# Patient Record
Sex: MALE | Race: BLACK OR AFRICAN AMERICAN
[De-identification: names, ages, dates, MRNs, and addresses within clinical notes are randomized per-mention and may not be internally consistent; named-entity substitution may affect disease eponyms.]

---

## 2017-06-20 ENCOUNTER — HOSPITAL ENCOUNTER (INPATIENT)
Dept: HOSPITAL 61 - ER | Age: 22
LOS: 2 days | Discharge: HOME | DRG: 501 | End: 2017-06-22
Attending: INTERNAL MEDICINE | Admitting: INTERNAL MEDICINE
Payer: SELF-PAY

## 2017-06-20 VITALS — HEIGHT: 77 IN | BODY MASS INDEX: 21.25 KG/M2 | WEIGHT: 180 LBS

## 2017-06-20 VITALS — SYSTOLIC BLOOD PRESSURE: 126 MMHG | DIASTOLIC BLOOD PRESSURE: 73 MMHG

## 2017-06-20 VITALS — DIASTOLIC BLOOD PRESSURE: 82 MMHG | SYSTOLIC BLOOD PRESSURE: 142 MMHG

## 2017-06-20 VITALS — SYSTOLIC BLOOD PRESSURE: 120 MMHG | DIASTOLIC BLOOD PRESSURE: 66 MMHG

## 2017-06-20 DIAGNOSIS — S86.022A: Primary | ICD-10-CM
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
## 2017-06-20 DIAGNOSIS — F12.90: ICD-10-CM
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
## 2017-06-20 DIAGNOSIS — S92.002B: ICD-10-CM
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
## 2017-06-20 LAB
ANION GAP SERPL CALC-SCNC: 9 MMOL/L (ref 6–14)
BASOPHILS # BLD AUTO: 0.1 X10^3/UL (ref 0–0.2)
BASOPHILS NFR BLD: 1 % (ref 0–3)
BUN SERPL-MCNC: 18 MG/DL (ref 8–26)
CALCIUM SERPL-MCNC: 9.2 MG/DL (ref 8.5–10.1)
CHLORIDE SERPL-SCNC: 104 MMOL/L (ref 98–107)
CO2 SERPL-SCNC: 27 MMOL/L (ref 21–32)
CREAT SERPL-MCNC: 1.2 MG/DL (ref 0.7–1.3)
EOSINOPHIL NFR BLD: 1 % (ref 0–3)
ERYTHROCYTE [DISTWIDTH] IN BLOOD BY AUTOMATED COUNT: 13.4 % (ref 11.5–14.5)
GFR SERPLBLD BASED ON 1.73 SQ M-ARVRAT: 90.8 ML/MIN
GLUCOSE SERPL-MCNC: 87 MG/DL (ref 70–99)
HCT VFR BLD CALC: 42.5 % (ref 39–53)
HGB BLD-MCNC: 14.6 G/DL (ref 13–17.5)
LYMPHOCYTES # BLD: 1.6 X10^3/UL (ref 1–4.8)
LYMPHOCYTES NFR BLD AUTO: 14 % (ref 24–48)
MCH RBC QN AUTO: 31 PG (ref 25–35)
MCHC RBC AUTO-ENTMCNC: 35 G/DL (ref 31–37)
MCV RBC AUTO: 89 FL (ref 79–100)
MONOCYTES NFR BLD: 6 % (ref 0–9)
NEUTROPHILS NFR BLD AUTO: 79 % (ref 31–73)
PLATELET # BLD AUTO: 149 X10^3/UL (ref 140–400)
POTASSIUM SERPL-SCNC: 4.1 MMOL/L (ref 3.5–5.1)
RBC # BLD AUTO: 4.77 X10^6/UL (ref 4.3–5.7)
SODIUM SERPL-SCNC: 140 MMOL/L (ref 136–145)
WBC # BLD AUTO: 11 X10^3/UL (ref 4–11)

## 2017-06-20 PROCEDURE — 85610 PROTHROMBIN TIME: CPT

## 2017-06-20 PROCEDURE — 85018 HEMOGLOBIN: CPT

## 2017-06-20 PROCEDURE — 36415 COLL VENOUS BLD VENIPUNCTURE: CPT

## 2017-06-20 PROCEDURE — 73630 X-RAY EXAM OF FOOT: CPT

## 2017-06-20 PROCEDURE — 96372 THER/PROPH/DIAG INJ SC/IM: CPT

## 2017-06-20 PROCEDURE — 73610 X-RAY EXAM OF ANKLE: CPT

## 2017-06-20 PROCEDURE — 85014 HEMATOCRIT: CPT

## 2017-06-20 PROCEDURE — 85730 THROMBOPLASTIN TIME PARTIAL: CPT

## 2017-06-20 PROCEDURE — 80048 BASIC METABOLIC PNL TOTAL CA: CPT

## 2017-06-20 PROCEDURE — 85027 COMPLETE CBC AUTOMATED: CPT

## 2017-06-20 RX ADMIN — HYDROCODONE BITARTRATE AND ACETAMINOPHEN PRN TAB: 10; 325 TABLET ORAL at 18:23

## 2017-06-20 RX ADMIN — HYDROMORPHONE HYDROCHLORIDE PRN MG: 2 INJECTION INTRAMUSCULAR; INTRAVENOUS; SUBCUTANEOUS at 19:50

## 2017-06-20 NOTE — ACF
Admit Criteria Forms





               MUSCULOSKELETAL DISEASE GRG





Clinical Indications for Admission to Inpatient Care





                                                            (Place 'X' for any 

and all applicable criteria):





Hospital admission is needed for appropriate care of the patient because of 1 

or more of the following:





[X]I.    Fracture, dislocation, or other musculoskeletal injury requiring 

inpatient care(medical) as indicated 


         by 1 or more of the following(4)(5)(6)(7)


         [ ]a)  Vertebral fracture requiring observation for instability or 

neurologic compromise (8)


         [ ]b)  Compartment syndrome (proven or cannot be ruled out during 

observation level of care) (9)


         [ ]c)  Limb-threatening injury


         [ ]d)  Major injury requiring inpatient stabilization such as traction 

initiation or external fixation 


                 before internal fixation or closure of complex or open fracture


         [ ]e)  Major injury requiring inpatient treatment after emergency or 

observation level care (as appropriate)


         [X]f)   Severe pain requiring acute inpatient management


         [ ]g)  Injury with suspicion of abuse or neglect (eg., child, 

dependent elderly)





[ ]II.    Newly diagnosed or suspected bone, joint, or orthopedic device 

infection (e.g., osteomyelitis, septic arthritis) 


          needing 1 or more of the following(1)(2)(3)


          [ ]a)   IV antibiotics that cannot be initiated in other than 

inpatient setting (e.g., patient too unstable or


                   home infusion not available)


          [ ]b)   Device removal or replacement


          [ ]c)   Bone or soft tissue debridement


          [ ]d)   Joint drainage (drain placement or repetitive aspirations)


[ ]III.    Severe rheumatologic disease (e.g., systemic lupus erythematosus, 

rheumatoid arthritis) with complications


          or comorbidities (Also use Optimal Recovery Care Criteria or General 

Recovery Criteria as appropriate on the 


          basis of predominant condition), including 1 or more of the following(

10)(11)(12)(13)


          [ ]a)  Severe infection (e.g., CNS infection, sepsis) (14)


          [ ]b)  Respiratory complications, including 1 or more of the following

:


                  [ ]i)     Pleural effusion with respiratory compromise      


                  [ ]ii)    Pulmonary hypertension with congestive failure 


                  [ ]iii)   Respiratory failure


                  [ ]iv)   Pulmonary hemorrhage (15)


           [ ]c) Hematologic disease, including 1 or more of the following:


                 [ ]i)     Coagulopathy with bleeding        


                 [ ]ii)    Thrombosis with hypercoagulable state      


                 [ ]iii)   Thrombotic thrombocytopenic purpura 


           [ ]d) Cerebritis with seizures, psychosis, or other severe 

abnormalities


           [ ]e) Vertebral destruction with monitoring needed for cervical 

myelopathy& possible respiratory compromise


           [ ]f)  Exacerbation that requires inpatient treatment (e.g., 

intravenous immunosuppression) (16)


           [ ]g) Acute renal failure


           [ ]h) Cerebritis with seizures, psychosis, Altered mental status, or 

other neurologic abnormalities


           [ ]i) Pericardial effusion with tamponade


           [ ]j) Vertebral destruction, with monitoring needed for cervical 

myelopathy and possible respiratory compromise





[ ]IV. Severe vasculitis with complications or comorbidities (Also use Optimal 

Recovery Care Criteria 


        General Recovery Criteria as appropriate on the basis of predominant 

condition), including


        1 or more of the following(11)(12)(17)(18)(19)(20)


        [ ]a)   Exacerbation that requires inpatient treatment (e.g., 

intravenous immunosuppression) (19)(21)


        [ ]b)    Pulmonary hemorrhage (15)                                     

                                  


        [ ]c)   CNS vasculitis with seizures, psychosis, Altered mental status 

that is severe or persistent, or other severe abnormalities (22)


        [ ]d)  Cerebral infarction                                             


        [ ]e)  Gastrointestinal ischemia 


        [ ]f)   Gangrene or threatened amputation


        [ ]g)  Renal failure (16)   


        [ ]h) Other significant complications of vasculitis ( eg., tissue or 

organ ischemia, organ dysfunction )





[ ]V.  Severe myopathy as indicated by 1 or more of the following (28)(29)


        [ ]a)  New onset of airway compromise or inability to swallow


        [ ]b)  Respiratory deterioration with observation needed for impending 

respiratory failure


        [ ]c)  Exacerbation that requires inpatient treatment (e.g., 

intravenous immunosuppression) 





[ ]VI. Severe crystal gout (arthropathy) indicated by 1 or more of the 

following (23)(24)


        [ ]a)  Severe pain requiring acute inpatient management


        [ ]b)  Exacerbation that requires inpatient treatment (e.g., 

intravenous treatment) 





[ ]VII.Rhabdomyolysis and 1 or more of the following (25)(26)(27)


        [ ]a)  Acute renal failure


        [ ]b)  Need for intravenous hydration after emergency or observation 

level care (as appropriate)


        [ ]c)  Inability to maintain oral hydration


        [ ]d)  Change in mental status


        [ ]e)  Electrolyte abnormality that remains after emergency or 

observation level care (as appropriate) 





[ ]VIII Post amputation complication, as indicated by ANY ONE of the following 


         [ ]a) Infection


         [ ]b) Dehiscence


         [ ]c) Myodesis failure        





[ ]IX. Severe pain requiring acute inpatient management due to musculoskeletal 

condition


       


[ ]X.  Musculoskeletal Disease and ALL of the following:


        [ ]a)  Symptom or finding for which emergency and observation care have 

failed or are not considered 


                appropriate (Use General Criteria: Observation Care as 

appropriate)


        [ ]b)  Presence of ANY ONE of the following


               [ ]i)   A General Admission Criteria    


               [ ]ii)  A Pediatric General Admission Criteria 











The original [facility] content created by [facility] has been revised. 


The portions of the content which have been revised are identified through the 

use of italic text or in bold, and [facility] 


has neither reviewed nor approved the modified material. All other unmodified 

content is copyright  [facility].





Please see references footnoted in the original [facility] edition 

2016











RAMBO SIGALA Jun 20, 2017 15:17

## 2017-06-20 NOTE — RAD
Examination: 3 views of the left ankle and foot



History: History of lawnmower laceration to back of the heel



Comparison: None available 



Findings:





The ankle mortise appears intact. The alignment of the tarsal bones,

tarsometatarsal joints grossly appears unremarkable. Mild hallux valgus

identified



There is laceration identified in the posterior left foot at the level of the

Achilles tendon attachment to the calcaneus. The laceration appears to extend

anteriorly to posterior. Subtle bony fragments identified superior to the

calcaneus could be tiny avulsed fracture fragments.



Impression:



1. Large laceration identified superior to the calcaneus at the attachment of

the Achilles tendon to the calcaneus, suspect Achilles tendon injury. Subtle

bony fragments identified superior to the calcaneus could be tiny avulsed

fracture fragments.

## 2017-06-20 NOTE — PHYS DOC
Past Medical History


Past Medical History:  No Pertinent History


Past Surgical History:  No Surgical History


Alcohol Use:  None


Drug Use:  None





Adult General


Chief Complaint


Chief Complaint:  FOOT INJURY PAIN





[facility]


HPI





Patient is a 22  year old male who presents with with family for evaluation of 

left posterior foot pain and laceration, achy pain that is constant, worse with 

range of motion. He was mowing, when he tried to run away from wasps. His mower 

fell over on its side and the back of his left foot was hit by a a mower blade. 

He is unable to bear weight on his left foot. He denies numbness, tingling. He 

denies other injury.





Review of Systems


Review of Systems





Constitutional: Denies fever or chills []


Eyes: Denies change in visual acuity, redness, or eye pain []


HENT: Denies nasal congestion or sore throat []


Respiratory: Denies cough or shortness of breath []


Cardiovascular: No additional information not addressed in HPI []


GI: Denies abdominal pain, nausea, vomiting, bloody stools or diarrhea []


: Denies dysuria or hematuria []


Musculoskeletal: Denies back pain []


Integument: Denies rash or skin lesions []


Neurologic: Denies headache, focal weakness or sensory changes []


Endocrine: Denies polyuria or polydipsia []





Current Medications


Current Medications





Current Medications








 Medications


  (Trade)  Dose


 Ordered  Sig/Rachael  Start Time


 Stop Time Status Last Admin


Dose Admin


 


 Acetaminophen/


 Hydrocodone Bitart


  (Lortab 10/325)  1 tab  1X  ONCE  6/20/17 14:15


 6/20/17 14:16 DC 6/20/17 14:10


1 TAB











Allergies


Allergies





Allergies








Coded Allergies Type Severity Reaction Last Updated Verified


 


  No Known Drug Allergies    6/20/17 No











Physical Exam


Physical Exam





Constitutional: Well developed, well nourished, no acute distress, non-toxic 

appearance. []


HENT: Normocephalic, atraumatic, bilateral external ears normal, oropharynx 

moist, nose normal. []


Eyes: PERRLA, EOMI. [] 


Neck: Normal range of motion, no tenderness, supple. [] 


Cardiovascular:Heart rate regular rhythm []


Lungs & Thorax:  Bilateral breath sounds clear to auscultation []


Abdomen: Bowel sounds normal, soft, no tenderness. [] 


Skin: Warm, dry, no erythema, no rash. [] 


Back: Normal ROM. [] 


Extremities: Left lower extremity with laceration to posterior aspect of left 

ankle just proximal to calcaneus with obvious tendon laceration, slight oozing 

blood; no visualized bone; Able to flex/ex/IR/ER hip, knee full rom, ankle df 

intact, but cannot plantar flex, toes df/pf; SILT watson/sa/sp/dp/tib distributions

; good dp and pt pulses equal bilaterally


Neurologic: Alert and oriented X 3, normal motor function, normal sensory 

function, no focal deficits noted. []


Psychologic: Affect normal, judgement normal, mood normal. []





Current Patient Data


Vital Signs





 Vital Signs








  Date Time  Temp Pulse Resp B/P (MAP) Pulse Ox O2 Delivery O2 Flow Rate FiO2


 


6/20/17 14:33  64 16 130/69 (89) 98 Room Air  


 


6/20/17 13:30 97.7       





 97.7       











Radiology/Procedures


Radiology/Procedures


X-ray of left foot and left ankle as interpreted by me with no obvious fracture 

or dislocation, has obvious soft tissue defect with subcutaneous air just above 

the calcaneus





Course & Med Decision Making


Course & Med Decision Making


Pertinent Labs and Imaging studies reviewed. (See chart for details)





Wound was irrigated after administration of lidocaine, 8ml SQ.  Hemostasis 

achieved.  Discussed case with Dr. Toure, orthopedics, who recommends admission 

for operative management in the morning. Discussed case with Dr. Lee, general 

surgery, for trauma consult. Consult orders placed. Discussed case with Dr. Rios, who will admit.





Dragon Disclaimer


Dragon Disclaimer


This electronic medical record was generated, in whole or in part, using a 

voice recognition dictation system.





Departure


Departure


Impression:  


 Primary Impression:  


 Laceration of left Achilles tendon, initial encounter


Disposition:  09 ADMITTED AS INPATIENT


Condition:  STABLE


Referrals:  


BAYLEE NICHOLS (PCP)











TWAN READ MD Jun 20, 2017 14:20

## 2017-06-20 NOTE — HP
ADMIT DATE:  06/20/2017



CHIEF COMPLAINT:  Injury in a lawnmower accident to left foot.



HISTORY OF PRESENT ILLNESS:  The patient is a 22-year-old 

gentleman without any medical history who presented to the Emergency Room with

laceration and pain to his left posterior foot.  He was found with Achilles

tendon laceration.  He now has been admitted with plans for surgery by Dr. Toure

tomorrow.  Currently, he relates that pain is fairly intense.  He denies any

numbness or tingling in the distal foot.  Denies any other injuries or areas of

pain.



PAST MEDICAL HISTORY:  None.



FAMILY HISTORY:  Negative.



SOCIAL HISTORY:  Lives with his parents.  Smokes pot occasionally.  No tobacco

use or other drugs.



ALLERGIES:  No known drug allergies.



HOME MEDICATIONS:  None.



REVIEW OF SYSTEMS:  Positive for left foot pain, fairly intense at this time,

the patient is requesting pain medication.  Denies any other complaints in rest

of organ system review.



PHYSICAL EXAMINATION:

VITAL SIGNS:  From today show a blood pressure of 142/82, heart rate of 68,

respiratory rate at 18.  He is afebrile.  Of note, previous blood pressures have

been in the 130/60 range.

GENERAL:  This is a slim 22-year-old  gentleman, alert and

oriented, in no acute distress.

HEENT:  Shows no scleral icterus.

NECK:  Supple.

LUNGS:  Clear.

HEART:  Has regular rate and rhythm.

ABDOMEN:  Has positive bowel sounds, soft, nontender.

EXTREMITIES:  Show no edema.  Gauze bandage around his left heel is soaked with

blood.



LABORATORY DATA:  CBC with a WBC of 11.0, hemoglobin 14.6, platelets of 149. 

Chemistries with a BUN and creatinine of 18 and 1.2 and normal electrolytes. 

Coags not obtained.



IMAGING:  X-ray of the left foot and ankle shows large laceration superior to

the calcaneus at the attachment of the Achilles tendon to the calcaneus,

suspected Achilles tendon injury, subtle bony fragments identified superior to

the calcaneus could be tiny avulsed fracture fragments.



ASSESSMENT AND PLAN:  The patient is a 22-year-old gentleman with heel injury

suspected Achilles tendon injury.



PLAN:  Plan is for surgery by Dr. Toure in the a.m.  In the meantime, pain

control will be achieved with Norco, this seemed to work fairly well for him in

the Emergency Room.  He has Dilaudid as backup.  He will be n.p.o. in the a.m. 

We will hold off anticoagulation at this time pending surgery.

 



______________________________

MIRIAM TOWNSEND MD



DR:  NATO/nts  JOB#:  [id number] / [id number]

DD:  06/20/2017 18:16  DT:  06/20/2017 21:32

AMANDA

## 2017-06-21 VITALS — SYSTOLIC BLOOD PRESSURE: 117 MMHG | DIASTOLIC BLOOD PRESSURE: 48 MMHG

## 2017-06-21 VITALS — SYSTOLIC BLOOD PRESSURE: 138 MMHG | DIASTOLIC BLOOD PRESSURE: 64 MMHG

## 2017-06-21 VITALS — DIASTOLIC BLOOD PRESSURE: 57 MMHG | SYSTOLIC BLOOD PRESSURE: 116 MMHG

## 2017-06-21 VITALS — DIASTOLIC BLOOD PRESSURE: 58 MMHG | SYSTOLIC BLOOD PRESSURE: 121 MMHG

## 2017-06-21 VITALS — SYSTOLIC BLOOD PRESSURE: 115 MMHG | DIASTOLIC BLOOD PRESSURE: 61 MMHG

## 2017-06-21 VITALS — DIASTOLIC BLOOD PRESSURE: 45 MMHG | SYSTOLIC BLOOD PRESSURE: 111 MMHG

## 2017-06-21 VITALS — DIASTOLIC BLOOD PRESSURE: 64 MMHG | SYSTOLIC BLOOD PRESSURE: 135 MMHG

## 2017-06-21 VITALS — DIASTOLIC BLOOD PRESSURE: 84 MMHG | SYSTOLIC BLOOD PRESSURE: 129 MMHG

## 2017-06-21 VITALS — DIASTOLIC BLOOD PRESSURE: 62 MMHG | SYSTOLIC BLOOD PRESSURE: 119 MMHG

## 2017-06-21 VITALS — DIASTOLIC BLOOD PRESSURE: 48 MMHG | SYSTOLIC BLOOD PRESSURE: 12 MMHG

## 2017-06-21 VITALS — SYSTOLIC BLOOD PRESSURE: 115 MMHG | DIASTOLIC BLOOD PRESSURE: 65 MMHG

## 2017-06-21 LAB
ANION GAP SERPL CALC-SCNC: 8 MMOL/L (ref 6–14)
APTT BLD: 34 SEC (ref 24–38)
BASOPHILS # BLD AUTO: 0.1 X10^3/UL (ref 0–0.2)
BASOPHILS NFR BLD: 1 % (ref 0–3)
BUN SERPL-MCNC: 17 MG/DL (ref 8–26)
CALCIUM SERPL-MCNC: 8.7 MG/DL (ref 8.5–10.1)
CHLORIDE SERPL-SCNC: 105 MMOL/L (ref 98–107)
CO2 SERPL-SCNC: 29 MMOL/L (ref 21–32)
CREAT SERPL-MCNC: 1.2 MG/DL (ref 0.7–1.3)
EOSINOPHIL NFR BLD: 2 % (ref 0–3)
ERYTHROCYTE [DISTWIDTH] IN BLOOD BY AUTOMATED COUNT: 13.2 % (ref 11.5–14.5)
GFR SERPLBLD BASED ON 1.73 SQ M-ARVRAT: 90.8 ML/MIN
GLUCOSE SERPL-MCNC: 86 MG/DL (ref 70–99)
HCT VFR BLD CALC: 40.9 % (ref 39–53)
HGB BLD-MCNC: 14 G/DL (ref 13–17.5)
INR PPP: 1.3 (ref 0.8–1.1)
LYMPHOCYTES # BLD: 2.1 X10^3/UL (ref 1–4.8)
LYMPHOCYTES NFR BLD AUTO: 22 % (ref 24–48)
MCH RBC QN AUTO: 31 PG (ref 25–35)
MCHC RBC AUTO-ENTMCNC: 34 G/DL (ref 31–37)
MCV RBC AUTO: 89 FL (ref 79–100)
MONOCYTES NFR BLD: 9 % (ref 0–9)
NEUTROPHILS NFR BLD AUTO: 68 % (ref 31–73)
PLATELET # BLD AUTO: 147 X10^3/UL (ref 140–400)
POTASSIUM SERPL-SCNC: 3.6 MMOL/L (ref 3.5–5.1)
PROTHROMBIN TIME: 15.2 SEC (ref 11.7–14)
RBC # BLD AUTO: 4.58 X10^6/UL (ref 4.3–5.7)
SODIUM SERPL-SCNC: 142 MMOL/L (ref 136–145)
WBC # BLD AUTO: 9.6 X10^3/UL (ref 4–11)

## 2017-06-21 PROCEDURE — 0LMP0ZZ REATTACHMENT OF LEFT LOWER LEG TENDON, OPEN APPROACH: ICD-10-PCS | Performed by: ORTHOPAEDIC SURGERY

## 2017-06-21 PROCEDURE — 0QBM0ZZ EXCISION OF LEFT TARSAL, OPEN APPROACH: ICD-10-PCS | Performed by: ORTHOPAEDIC SURGERY

## 2017-06-21 RX ADMIN — MORPHINE SULFATE PRN MG: 4 INJECTION, SOLUTION INTRAMUSCULAR; INTRAVENOUS at 18:50

## 2017-06-21 RX ADMIN — FENTANYL CITRATE PRN MCG: 50 INJECTION INTRAMUSCULAR; INTRAVENOUS at 15:30

## 2017-06-21 RX ADMIN — HYDROMORPHONE HYDROCHLORIDE PRN MG: 2 INJECTION INTRAMUSCULAR; INTRAVENOUS; SUBCUTANEOUS at 10:49

## 2017-06-21 RX ADMIN — HYDROMORPHONE HYDROCHLORIDE PRN MG: 2 INJECTION INTRAMUSCULAR; INTRAVENOUS; SUBCUTANEOUS at 08:23

## 2017-06-21 RX ADMIN — HYDROMORPHONE HYDROCHLORIDE PRN MG: 2 INJECTION INTRAMUSCULAR; INTRAVENOUS; SUBCUTANEOUS at 01:39

## 2017-06-21 RX ADMIN — MORPHINE SULFATE PRN MG: 4 INJECTION, SOLUTION INTRAMUSCULAR; INTRAVENOUS at 23:08

## 2017-06-21 RX ADMIN — FENTANYL CITRATE PRN MCG: 50 INJECTION INTRAMUSCULAR; INTRAVENOUS at 14:57

## 2017-06-21 RX ADMIN — MORPHINE SULFATE PRN MG: 4 INJECTION, SOLUTION INTRAMUSCULAR; INTRAVENOUS at 16:10

## 2017-06-21 RX ADMIN — MORPHINE SULFATE PRN MG: 4 INJECTION, SOLUTION INTRAMUSCULAR; INTRAVENOUS at 21:05

## 2017-06-21 RX ADMIN — FENTANYL CITRATE PRN MCG: 50 INJECTION INTRAMUSCULAR; INTRAVENOUS at 15:13

## 2017-06-21 RX ADMIN — FENTANYL CITRATE PRN MCG: 50 INJECTION INTRAMUSCULAR; INTRAVENOUS at 15:02

## 2017-06-21 NOTE — PDOC
PROGRESS NOTES


Chief Complaint


Chief Complaint


Achilles tendon injury.








ASASESSMENT AND PLAN: 


1.  Achilles tendon injury:  surgery by Dr. Toure later today 


2.  Pain control:  adequate;  Norco, dilaudid





History of Present Illness


History of Present Illness


pain fairly well controlled.  no other c/o





Vitals


Vitals





Vital Signs








  Date Time  Temp Pulse Resp B/P (MAP) Pulse Ox O2 Delivery O2 Flow Rate FiO2


 


6/21/17 08:23   16   Room Air  


 


6/21/17 07:00 97.8 62  138/64 (88) 99   





 97.8       











Physical Exam


General:  Alert, Oriented X3, Cooperative, No acute distress


Heart:  Regular rate


Lungs:  Clear


Abdomen:  Normal bowel sounds, Soft, No tenderness


Extremities:  No clubbing, No edema, Other (L foot)


Skin:  No rashes





Labs


LABS





Laboratory Tests








Test


  6/20/17


15:10 6/21/17


07:20


 


White Blood Count


  11.0 x10^3/uL


(4.0-11.0) 9.6 x10^3/uL


(4.0-11.0)


 


Red Blood Count


  4.77 x10^6/uL


(4.30-5.70) 4.58 x10^6/uL


(4.30-5.70)


 


Hemoglobin


  14.6 g/dL


(13.0-17.5) 14.0 g/dL


(13.0-17.5)


 


Hematocrit


  42.5 %


(39.0-53.0) 40.9 %


(39.0-53.0)


 


Mean Corpuscular Volume 89 fL ()  89 fL () 


 


Mean Corpuscular Hemoglobin 31 pg (25-35)  31 pg (25-35) 


 


Mean Corpuscular Hemoglobin


Concent 35 g/dL


(31-37) 34 g/dL


(31-37)


 


Red Cell Distribution Width


  13.4 %


(11.5-14.5) 13.2 %


(11.5-14.5)


 


Platelet Count


  149 x10^3/uL


(140-400) 147 x10^3/uL


(140-400)


 


Neutrophils (%) (Auto) 79 % (31-73)  68 % (31-73) 


 


Lymphocytes (%) (Auto) 14 % (24-48)  22 % (24-48) 


 


Monocytes (%) (Auto) 6 % (0-9)  9 % (0-9) 


 


Eosinophils (%) (Auto) 1 % (0-3)  2 % (0-3) 


 


Basophils (%) (Auto) 1 % (0-3)  1 % (0-3) 


 


Neutrophils # (Auto)


  8.6 x10^3uL


(1.8-7.7) 6.5 x10^3uL


(1.8-7.7)


 


Lymphocytes # (Auto)


  1.6 x10^3/uL


(1.0-4.8) 2.1 x10^3/uL


(1.0-4.8)


 


Monocytes # (Auto)


  0.6 x10^3/uL


(0.0-1.1) 0.8 x10^3/uL


(0.0-1.1)


 


Eosinophils # (Auto)


  0.1 x10^3/uL


(0.0-0.7) 0.2 x10^3/uL


(0.0-0.7)


 


Basophils # (Auto)


  0.1 x10^3/uL


(0.0-0.2) 0.1 x10^3/uL


(0.0-0.2)


 


Sodium Level


  140 mmol/L


(136-145) 142 mmol/L


(136-145)


 


Potassium Level


  4.1 mmol/L


(3.5-5.1) 3.6 mmol/L


(3.5-5.1)


 


Chloride Level


  104 mmol/L


() 105 mmol/L


()


 


Carbon Dioxide Level


  27 mmol/L


(21-32) 29 mmol/L


(21-32)


 


Anion Gap 9 (6-14)  8 (6-14) 


 


Blood Urea Nitrogen


  18 mg/dL


(8-26) 17 mg/dL


(8-26)


 


Creatinine


  1.2 mg/dL


(0.7-1.3) 1.2 mg/dL


(0.7-1.3)


 


Estimated GFR


(Cockcroft-Gault) 90.8 


  90.8 


 


 


Glucose Level


  87 mg/dL


(70-99) 86 mg/dL


(70-99)


 


Calcium Level


  9.2 mg/dL


(8.5-10.1) 8.7 mg/dL


(8.5-10.1)


 


Prothrombin Time


  


  15.2 SEC


(11.7-14.0)


 


Prothromb Time International


Ratio 


  1.3 (0.8-1.1) 


 


 


Activated Partial


Thromboplast Time 


  34 SEC (24-38) 


 

















MIRIAM TOWNSEND MD Jun 21, 2017 10:33

## 2017-06-21 NOTE — PDOC2
CONSULT


Date of Consult


Date of Consult


DATE: 6/21/17





Reason for Consult


Reason for Consult:


left Achilles tendon rupture





Referring Physician


Referring Physician:


Dr. Garrett





Identification/Chief Complaint


Chief Complaint


left heel pain





Source


Source:  Chart review, Patient





History of Present Illness


Reason for Visit:


Mr. Dixon is a 22 year old male patient who presented to the ED yesterday with 

a lacerated Achilles tendon. He was using a self-propelled lawnmower when he 

ran into a wasp nest. As he was trying to run away, the lawnmower turned on its 

side and the blade cut the back of his heel. He denies any tingling or numbness 

in the foot.





Past Medical History


Cardiovascular:  No pertinent hx


Pulmonary:  No pertinent hx


Heme/Onc:  No pertinent hx





Past Surgical History


Past Surgical History:  No pertinent history





Family History


Family History:  No Significant





Social History


No


ALCOHOL:  none


Drugs:  None


Lives:  with Family





Current Problem List


Problem List


Problems


Medical Problems:


(1) Laceration of left Achilles tendon, initial encounter


Status: Acute  











Current Medications


Current Medications





Current Medications


Acetaminophen/ Hydrocodone Bitart (Lortab 10/325) 1 tab 1X  ONCE PO  Last 

administered on 6/20/17at 14:10;  Start 6/20/17 at 14:15;  Stop 6/20/17 at 14:16

;  Status DC


Lidocaine/Sodium Bicarbonate (Buffered Lidocaine 1%) 20 ml 1X  ONCE IJ  Last 

administered on 6/20/17at 15:00;  Start 6/20/17 at 15:00;  Stop 6/20/17 at 15:01

;  Status DC


Lidocaine HCl 20 ml STK-MED ONCE .ROUTE ;  Start 6/20/17 at 14:48;  Stop 6/20/ 17 at 14:49;  Status DC


Ondansetron HCl (Zofran) 4 mg PRN Q6HRS  PRN IV NAUSEA/VOMITING;  Start 6/21/17 

at 07:00;  Stop 6/22/17 at 06:59


Fentanyl Citrate (Fentanyl 2ml Vial) 25 mcg PRN Q5MIN  PRN IV MILD PAIN;  Start 

6/21/17 at 07:00;  Stop 6/21/17 at 07:00;  Status DC


Fentanyl Citrate (Fentanyl 2ml Vial) 50 mcg PRN Q5MIN  PRN IV MODERATE PAIN;  

Start 6/21/17 at 07:00;  Stop 6/21/17 at 07:00;  Status DC


Morphine Sulfate 1 mg PRN Q10MIN  PRN IV SEVERE PAIN;  Start 6/21/17 at 07:00;  

Stop 6/21/17 at 07:00;  Status DC


Ringer's Solution 1,000 ml @  30 mls/hr Q24H IV  Last administered on 6/21/17at 

12:29;  Start 6/21/17 at 07:00;  Stop 6/21/17 at 18:59


Lidocaine HCl 2 ml PRN 1X  PRN ID PRIOR TO IV START;  Start 6/21/17 at 07:00;  

Stop 6/22/17 at 06:59


Hydromorphone HCl (Dilaudid) 0.5 mg PRN Q10MIN  PRN IV SEV PAIN, Second choice;

  Start 6/21/17 at 07:00;  Stop 6/21/17 at 07:00;  Status DC


Prochlorperazine Edisylate (Compazine) 5 mg PACU PRN  PRN IV NAUSEA, MRX1;  

Start 6/21/17 at 07:00;  Stop 6/22/17 at 06:59


Acetaminophen/ Hydrocodone Bitart (Lortab 10/325) 1 tab PRN Q4HRS  PRN PO PAIN 

Last administered on 6/20/17at 18:23;  Start 6/20/17 at 18:15


Hydromorphone HCl (Dilaudid) 0.5 mg Q2H  PRN IV SEV PAIN, Second choice Last 

administered on 6/21/17at 10:49;  Start 6/20/17 at 19:37;  Stop 6/21/17 at 19:36


Desflurane (Suprane) 90 ml STK-MED ONCE IH ;  Start 6/21/17 at 12:20;  Stop 6/21 /17 at 12:21;  Status DC


Midazolam HCl (Versed) 2 mg STK-MED ONCE .ROUTE ;  Start 6/21/17 at 12:20;  

Stop 6/21/17 at 12:21;  Status DC


Fentanyl Citrate (Fentanyl 2ml Vial) 100 mcg STK-MED ONCE .ROUTE ;  Start 6/21/ 17 at 12:21;  Stop 6/21/17 at 12:22;  Status DC


Glycopyrrolate (Robinul) 1 mg STK-MED ONCE .ROUTE ;  Start 6/21/17 at 12:21;  

Stop 6/21/17 at 12:22;  Status DC


Neostigmine Methylsulfate 5 mg STK-MED ONCE .ROUTE ;  Start 6/21/17 at 12:21;  

Stop 6/21/17 at 12:22;  Status DC


Rocuronium Bromide (Zemuron) 50 mg STK-MED ONCE .ROUTE ;  Start 6/21/17 at 12:21

;  Stop 6/21/17 at 12:22;  Status DC


Propofol 20 ml @ As Directed STK-MED ONCE IV ;  Start 6/21/17 at 12:21;  Stop 6/ 21/17 at 12:22;  Status DC


Ondansetron HCl (Zofran) 4 mg STK-MED ONCE .ROUTE ;  Start 6/21/17 at 12:21;  

Stop 6/21/17 at 12:22;  Status DC


Dexamethasone Sodium Phosphate (Decadron) 20 mg STK-MED ONCE .ROUTE ;  Start 6/ 21/17 at 12:21;  Stop 6/21/17 at 12:22;  Status DC


Lidocaine HCl (Lidocaine Pf 2% Vial) 5 ml STK-MED ONCE .ROUTE ;  Start 6/21/17 

at 12:22;  Stop 6/21/17 at 12:23;  Status DC


Bupivacaine HCl/ Epinephrine Bitart (Sensorcaine-Epi 0.25%-1:341221 Mpf) 30 ml 

STK-MED ONCE .ROUTE ;  Start 6/21/17 at 12:48;  Stop 6/21/17 at 12:49;  Status 

DC


Bupivacaine HCl (Sensorcaine Mpf 0.25%) 30 ml STK-MED ONCE .ROUTE ;  Start 6/21/ 17 at 12:48;  Stop 6/21/17 at 12:49;  Status DC


Cefazolin Sodium/ Dextrose 50 ml @ As Directed STK-MED ONCE IV ;  Start 6/21/17 

at 12:52;  Stop 6/21/17 at 12:53;  Status DC


Ondansetron HCl (Zofran) 4 mg PRN Q6HRS  PRN IV NAUSEA/VOMITING;  Start 6/21/17 

at 14:15;  Stop 6/22/17 at 14:14


Fentanyl Citrate (Fentanyl 2ml Vial) 25 mcg PRN Q5MIN  PRN IV MILD PAIN;  Start 

6/21/17 at 14:15;  Stop 6/22/17 at 14:14


Fentanyl Citrate (Fentanyl 2ml Vial) 50 mcg PRN Q5MIN  PRN IV MODERATE PAIN 

Last administered on 6/21/17at 15:30;  Start 6/21/17 at 14:15;  Stop 6/22/17 at 

14:14


Morphine Sulfate 1 mg PRN Q10MIN  PRN IV SEVERE PAIN;  Start 6/21/17 at 14:15;  

Stop 6/22/17 at 14:14


Ringer's Solution 1,000 ml @  30 mls/hr Q24H IV ;  Start 6/21/17 at 14:03;  

Stop 6/22/17 at 02:02


Lidocaine HCl 2 ml PRN 1X  PRN ID PRIOR TO IV START;  Start 6/21/17 at 14:15;  

Stop 6/22/17 at 14:14


Hydromorphone HCl (Dilaudid) 0.5 mg PRN Q10MIN  PRN IV SEV PAIN, Second choice;

  Start 6/21/17 at 14:15;  Stop 6/22/17 at 14:14


Prochlorperazine Edisylate (Compazine) 5 mg PACU PRN  PRN IV NAUSEA, MRX1;  

Start 6/21/17 at 14:15;  Stop 6/22/17 at 14:14


Oxycodone HCl (Roxicodone) 5 mg PRN Q3HRS  PRN PO PAIN;  Start 6/21/17 at 14:30


Morphine Sulfate 2 mg PRN Q1HR  PRN IV PAIN;  Start 6/21/17 at 14:30


Fentanyl Citrate (Fentanyl 2ml Vial) 25 mcg PRN Q1HR  PRN IV PAIN;  Start 6/21/ 17 at 14:30


Senna/Docusate Sodium (Senna Plus) 1 tab DAILY PO ;  Start 6/22/17 at 09:00


Polyethylene Glycol (miraLAX PACKET) 17 gm PRN DAILY  PRN PO CONSTIPATION;  

Start 6/21/17 at 14:30


Ondansetron HCl (Zofran) 4 mg PRN Q4HRS  PRN IV NAUSEA/VOMITING;  Start 6/21/17 

at 14:30


Aspirin (Danica Aspirin) 325 mg DAILYWBKFT PO ;  Start 6/22/17 at 08:00


Magnesium Hydroxide (Milk Of Magnesia) 2,400 mg 1X PRN  PRN PO CONSTIPATION;  

Start 6/22/17 at 06:00;  Stop 6/23/17 at 05:59


Bisacodyl (Dulcolax Supp) 10 mg 1X PRN  PRN CO CONSTIPATION;  Start 6/22/17 at 

16:00;  Stop 6/23/17 at 15:59


Acetaminophen/ Hydrocodone Bitart (Lortab 7.5/325) 1 tab PRN Q4HRS  PRN PO PAIN

;  Start 6/21/17 at 14:30


Morphine Sulfate 4 mg PRN Q2HR  PRN IV PAIN;  Start 6/21/17 at 14:30


Acetaminophen/ Hydrocodone Bitart (Lortab 7.5/325) 2 tab PRN Q4HRS  PRN PO PAIN

;  Start 6/21/17 at 14:30


Dextrose (Dextrose 50%-Water Syringe) 12.5 gm PRN Q15MIN  PRN IV SEE COMMENTS;  

Start 6/21/17 at 14:30


Cefazolin Sodium/ Dextrose 50 ml @  100 mls/hr Q6H IV ;  Start 6/21/17 at 19:00

;  Stop 6/22/17 at 07:29


Cefazolin Sodium/ Dextrose 50 ml @  100 mls/hr 1X PACU  PRN IV PER PROTOCOL 

Last administered on 6/21/17at 13:17;  Start 6/21/17 at 15:00





Allergies


Allergies:  


Coded Allergies:  


     No Known Drug Allergies (Unverified , 6/21/17)





Physical Exam


General:  Alert, Oriented X3, Cooperative, No acute distress


HEENT:  Atraumatic, EOMI


Lungs:  Normal air movement


Heart:  Regular rate


Abdomen:  Soft


Extremities:  No clubbing, No cyanosis, No edema, Normal pulses


Skin:  No rashes, No breakdown, Other (Traumatic laceration to the posterior 

ankle, just above the calcaneus. )


Neuro:  Normal speech, Normal tone, Sensation intact


Psych/Mental Status:  Mental status NL, Mood NL


MUSCULOSKELETAL:  Other (There is a horizontally-oriented laceration to the 

posterior ankle, just proximal to the calcanus. Obvious laceration of the 

Achilles tendon. Able to dorsiflex and plantarflex at toes. Not able to 

plantarflex at ankle. Motor and sensory function appears intact distally. Light 

touch sensation intact. Dorsalis pedis pulse intact and regular.)





Vitals


VITALS





Vital Signs








  Date Time  Temp Pulse Resp B/P (MAP) Pulse Ox O2 Delivery O2 Flow Rate FiO2


 


6/21/17 15:30   16  99 Room Air  


 


6/21/17 15:09 97.9 86  134/59    





 97.9       


 


6/21/17 14:39       10 











Labs


Labs





Laboratory Tests








Test


  6/20/17


15:10 6/21/17


07:20


 


White Blood Count


  11.0 x10^3/uL


(4.0-11.0) 9.6 x10^3/uL


(4.0-11.0)


 


Red Blood Count


  4.77 x10^6/uL


(4.30-5.70) 4.58 x10^6/uL


(4.30-5.70)


 


Hemoglobin


  14.6 g/dL


(13.0-17.5) 14.0 g/dL


(13.0-17.5)


 


Hematocrit


  42.5 %


(39.0-53.0) 40.9 %


(39.0-53.0)


 


Mean Corpuscular Volume 89 fL ()  89 fL () 


 


Mean Corpuscular Hemoglobin 31 pg (25-35)  31 pg (25-35) 


 


Mean Corpuscular Hemoglobin


Concent 35 g/dL


(31-37) 34 g/dL


(31-37)


 


Red Cell Distribution Width


  13.4 %


(11.5-14.5) 13.2 %


(11.5-14.5)


 


Platelet Count


  149 x10^3/uL


(140-400) 147 x10^3/uL


(140-400)


 


Neutrophils (%) (Auto) 79 % (31-73)  68 % (31-73) 


 


Lymphocytes (%) (Auto) 14 % (24-48)  22 % (24-48) 


 


Monocytes (%) (Auto) 6 % (0-9)  9 % (0-9) 


 


Eosinophils (%) (Auto) 1 % (0-3)  2 % (0-3) 


 


Basophils (%) (Auto) 1 % (0-3)  1 % (0-3) 


 


Neutrophils # (Auto)


  8.6 x10^3uL


(1.8-7.7) 6.5 x10^3uL


(1.8-7.7)


 


Lymphocytes # (Auto)


  1.6 x10^3/uL


(1.0-4.8) 2.1 x10^3/uL


(1.0-4.8)


 


Monocytes # (Auto)


  0.6 x10^3/uL


(0.0-1.1) 0.8 x10^3/uL


(0.0-1.1)


 


Eosinophils # (Auto)


  0.1 x10^3/uL


(0.0-0.7) 0.2 x10^3/uL


(0.0-0.7)


 


Basophils # (Auto)


  0.1 x10^3/uL


(0.0-0.2) 0.1 x10^3/uL


(0.0-0.2)


 


Sodium Level


  140 mmol/L


(136-145) 142 mmol/L


(136-145)


 


Potassium Level


  4.1 mmol/L


(3.5-5.1) 3.6 mmol/L


(3.5-5.1)


 


Chloride Level


  104 mmol/L


() 105 mmol/L


()


 


Carbon Dioxide Level


  27 mmol/L


(21-32) 29 mmol/L


(21-32)


 


Anion Gap 9 (6-14)  8 (6-14) 


 


Blood Urea Nitrogen


  18 mg/dL


(8-26) 17 mg/dL


(8-26)


 


Creatinine


  1.2 mg/dL


(0.7-1.3) 1.2 mg/dL


(0.7-1.3)


 


Estimated GFR


(Cockcroft-Gault) 90.8 


  90.8 


 


 


Glucose Level


  87 mg/dL


(70-99) 86 mg/dL


(70-99)


 


Calcium Level


  9.2 mg/dL


(8.5-10.1) 8.7 mg/dL


(8.5-10.1)


 


Prothrombin Time


  


  15.2 SEC


(11.7-14.0)


 


Prothromb Time International


Ratio 


  1.3 (0.8-1.1) 


 


 


Activated Partial


Thromboplast Time 


  34 SEC (24-38) 


 








Laboratory Tests








Test


  6/21/17


07:20


 


White Blood Count


  9.6 x10^3/uL


(4.0-11.0)


 


Red Blood Count


  4.58 x10^6/uL


(4.30-5.70)


 


Hemoglobin


  14.0 g/dL


(13.0-17.5)


 


Hematocrit


  40.9 %


(39.0-53.0)


 


Mean Corpuscular Volume 89 fL () 


 


Mean Corpuscular Hemoglobin 31 pg (25-35) 


 


Mean Corpuscular Hemoglobin


Concent 34 g/dL


(31-37)


 


Red Cell Distribution Width


  13.2 %


(11.5-14.5)


 


Platelet Count


  147 x10^3/uL


(140-400)


 


Neutrophils (%) (Auto) 68 % (31-73) 


 


Lymphocytes (%) (Auto) 22 % (24-48) 


 


Monocytes (%) (Auto) 9 % (0-9) 


 


Eosinophils (%) (Auto) 2 % (0-3) 


 


Basophils (%) (Auto) 1 % (0-3) 


 


Neutrophils # (Auto)


  6.5 x10^3uL


(1.8-7.7)


 


Lymphocytes # (Auto)


  2.1 x10^3/uL


(1.0-4.8)


 


Monocytes # (Auto)


  0.8 x10^3/uL


(0.0-1.1)


 


Eosinophils # (Auto)


  0.2 x10^3/uL


(0.0-0.7)


 


Basophils # (Auto)


  0.1 x10^3/uL


(0.0-0.2)


 


Prothrombin Time


  15.2 SEC


(11.7-14.0)


 


Prothromb Time International


Ratio 1.3 (0.8-1.1) 


 


 


Activated Partial


Thromboplast Time 34 SEC (24-38) 


 


 


Sodium Level


  142 mmol/L


(136-145)


 


Potassium Level


  3.6 mmol/L


(3.5-5.1)


 


Chloride Level


  105 mmol/L


()


 


Carbon Dioxide Level


  29 mmol/L


(21-32)


 


Anion Gap 8 (6-14) 


 


Blood Urea Nitrogen


  17 mg/dL


(8-26)


 


Creatinine


  1.2 mg/dL


(0.7-1.3)


 


Estimated GFR


(Cockcroft-Gault) 90.8 


 


 


Glucose Level


  86 mg/dL


(70-99)


 


Calcium Level


  8.7 mg/dL


(8.5-10.1)











Images


Images


Left foot and ankle x-rays were reviewed. Laceration to posterior ankle at the 

level of attachment of Achilles tendon to the calcaneus. Tiny bony fragments 

superior to calcaneus.





Assessment/Plan


Assessment/Plan


Left complete Achilles tendon rupture.





Dr. Toure recommended suture repair of the Achilles tendon. The surgical 

procedure and rehabilitation process were discussed with the patient and family 

at bedside. Possible risks of surgery were discussed, including infection, 

blood clots, bleeding, risks of rerupture, and any other potential surgical or 

anesthetic complications. The patient and his family agree to proceed. He will 

be in a splint and nonweightbearing with crutches for two weeks postoperatively 

and will stay inpatient for 24 hours of IV antibiotics.











ALYSON MCGARRY Jun 21, 2017 15:49

## 2017-06-21 NOTE — PDOC
SURGICAL PROGRESS NOTE


Subjective


Brief Trauma Surgery Consult


23 yo M with isolated achilles tendon laceration


Repair per ortho


No other recommendations


[facility]


[id number]


Vital Signs





Vital Signs








  Date Time  Temp Pulse Resp B/P (MAP) Pulse Ox O2 Delivery O2 Flow Rate FiO2


 


6/21/17 10:49   16   Room Air  


 


6/21/17 07:00 97.8 62  138/64 (88) 99   





 97.8       








I&O











Intake and Output 


 


 6/21/17





 07:00


 


Intake Total 315 ml


 


Output Total 350 ml


 


Balance -35 ml


 


 


 


Intake Oral 315 ml


 


Output Urine Total 350 ml


 


# Voids 1








Labs





Laboratory Tests








Test


  6/20/17


15:10 6/21/17


07:20


 


White Blood Count


  11.0 x10^3/uL


(4.0-11.0) 9.6 x10^3/uL


(4.0-11.0)


 


Red Blood Count


  4.77 x10^6/uL


(4.30-5.70) 4.58 x10^6/uL


(4.30-5.70)


 


Hemoglobin


  14.6 g/dL


(13.0-17.5) 14.0 g/dL


(13.0-17.5)


 


Hematocrit


  42.5 %


(39.0-53.0) 40.9 %


(39.0-53.0)


 


Mean Corpuscular Volume 89 fL ()  89 fL () 


 


Mean Corpuscular Hemoglobin 31 pg (25-35)  31 pg (25-35) 


 


Mean Corpuscular Hemoglobin


Concent 35 g/dL


(31-37) 34 g/dL


(31-37)


 


Red Cell Distribution Width


  13.4 %


(11.5-14.5) 13.2 %


(11.5-14.5)


 


Platelet Count


  149 x10^3/uL


(140-400) 147 x10^3/uL


(140-400)


 


Neutrophils (%) (Auto) 79 % (31-73)  68 % (31-73) 


 


Lymphocytes (%) (Auto) 14 % (24-48)  22 % (24-48) 


 


Monocytes (%) (Auto) 6 % (0-9)  9 % (0-9) 


 


Eosinophils (%) (Auto) 1 % (0-3)  2 % (0-3) 


 


Basophils (%) (Auto) 1 % (0-3)  1 % (0-3) 


 


Neutrophils # (Auto)


  8.6 x10^3uL


(1.8-7.7) 6.5 x10^3uL


(1.8-7.7)


 


Lymphocytes # (Auto)


  1.6 x10^3/uL


(1.0-4.8) 2.1 x10^3/uL


(1.0-4.8)


 


Monocytes # (Auto)


  0.6 x10^3/uL


(0.0-1.1) 0.8 x10^3/uL


(0.0-1.1)


 


Eosinophils # (Auto)


  0.1 x10^3/uL


(0.0-0.7) 0.2 x10^3/uL


(0.0-0.7)


 


Basophils # (Auto)


  0.1 x10^3/uL


(0.0-0.2) 0.1 x10^3/uL


(0.0-0.2)


 


Sodium Level


  140 mmol/L


(136-145) 142 mmol/L


(136-145)


 


Potassium Level


  4.1 mmol/L


(3.5-5.1) 3.6 mmol/L


(3.5-5.1)


 


Chloride Level


  104 mmol/L


() 105 mmol/L


()


 


Carbon Dioxide Level


  27 mmol/L


(21-32) 29 mmol/L


(21-32)


 


Anion Gap 9 (6-14)  8 (6-14) 


 


Blood Urea Nitrogen


  18 mg/dL


(8-26) 17 mg/dL


(8-26)


 


Creatinine


  1.2 mg/dL


(0.7-1.3) 1.2 mg/dL


(0.7-1.3)


 


Estimated GFR


(Cockcroft-Gault) 90.8 


  90.8 


 


 


Glucose Level


  87 mg/dL


(70-99) 86 mg/dL


(70-99)


 


Calcium Level


  9.2 mg/dL


(8.5-10.1) 8.7 mg/dL


(8.5-10.1)


 


Prothrombin Time


  


  15.2 SEC


(11.7-14.0)


 


Prothromb Time International


Ratio 


  1.3 (0.8-1.1) 


 


 


Activated Partial


Thromboplast Time 


  34 SEC (24-38) 


 








Laboratory Tests








Test


  6/20/17


15:10 6/21/17


07:20


 


White Blood Count


  11.0 x10^3/uL


(4.0-11.0) 9.6 x10^3/uL


(4.0-11.0)


 


Red Blood Count


  4.77 x10^6/uL


(4.30-5.70) 4.58 x10^6/uL


(4.30-5.70)


 


Hemoglobin


  14.6 g/dL


(13.0-17.5) 14.0 g/dL


(13.0-17.5)


 


Hematocrit


  42.5 %


(39.0-53.0) 40.9 %


(39.0-53.0)


 


Mean Corpuscular Volume 89 fL ()  89 fL () 


 


Mean Corpuscular Hemoglobin 31 pg (25-35)  31 pg (25-35) 


 


Mean Corpuscular Hemoglobin


Concent 35 g/dL


(31-37) 34 g/dL


(31-37)


 


Red Cell Distribution Width


  13.4 %


(11.5-14.5) 13.2 %


(11.5-14.5)


 


Platelet Count


  149 x10^3/uL


(140-400) 147 x10^3/uL


(140-400)


 


Neutrophils (%) (Auto) 79 % (31-73)  68 % (31-73) 


 


Lymphocytes (%) (Auto) 14 % (24-48)  22 % (24-48) 


 


Monocytes (%) (Auto) 6 % (0-9)  9 % (0-9) 


 


Eosinophils (%) (Auto) 1 % (0-3)  2 % (0-3) 


 


Basophils (%) (Auto) 1 % (0-3)  1 % (0-3) 


 


Neutrophils # (Auto)


  8.6 x10^3uL


(1.8-7.7) 6.5 x10^3uL


(1.8-7.7)


 


Lymphocytes # (Auto)


  1.6 x10^3/uL


(1.0-4.8) 2.1 x10^3/uL


(1.0-4.8)


 


Monocytes # (Auto)


  0.6 x10^3/uL


(0.0-1.1) 0.8 x10^3/uL


(0.0-1.1)


 


Eosinophils # (Auto)


  0.1 x10^3/uL


(0.0-0.7) 0.2 x10^3/uL


(0.0-0.7)


 


Basophils # (Auto)


  0.1 x10^3/uL


(0.0-0.2) 0.1 x10^3/uL


(0.0-0.2)


 


Sodium Level


  140 mmol/L


(136-145) 142 mmol/L


(136-145)


 


Potassium Level


  4.1 mmol/L


(3.5-5.1) 3.6 mmol/L


(3.5-5.1)


 


Chloride Level


  104 mmol/L


() 105 mmol/L


()


 


Carbon Dioxide Level


  27 mmol/L


(21-32) 29 mmol/L


(21-32)


 


Anion Gap 9 (6-14)  8 (6-14) 


 


Blood Urea Nitrogen


  18 mg/dL


(8-26) 17 mg/dL


(8-26)


 


Creatinine


  1.2 mg/dL


(0.7-1.3) 1.2 mg/dL


(0.7-1.3)


 


Estimated GFR


(Cockcroft-Gault) 90.8 


  90.8 


 


 


Glucose Level


  87 mg/dL


(70-99) 86 mg/dL


(70-99)


 


Calcium Level


  9.2 mg/dL


(8.5-10.1) 8.7 mg/dL


(8.5-10.1)


 


Prothrombin Time


  


  15.2 SEC


(11.7-14.0)


 


Prothromb Time International


Ratio 


  1.3 (0.8-1.1) 


 


 


Activated Partial


Thromboplast Time 


  34 SEC (24-38) 


 








Problem List


Problems


Medical Problems:


(1) Laceration of left Achilles tendon, initial encounter


Status: Acute  








Problems:  











JOSE FARFAN MD Jun 21, 2017 11:03

## 2017-06-21 NOTE — PDOC4
Operative Note


Operative Note


Date of Procedure:   June 21, 2017


 Pre-Op Diagnosis:   Left ankle laceration with complex tendon involvement. 

Open Achilles tendon laceration. Open fracture of the calcaneus.


Post-Op Diagnosis:   Same


Procedure:      Achilles tendon repair. Debridement for open fracture including 

skin, subcutaneous continues tissue, fascia, and bone. Closed treatment of 

calcaneal fracture without manipulation.


Surgeon:       Landon Toure MD


Assistant:      Zakiya Ruiz PA-C


Anesthesia:        General


EBL:       25 mL


Specimens Obtained:     none


Complications:        none


Drains:       none


Tourniquet time:    43 minutes


Indications for Procedure: The patient is a 22-year-old with an open left ankle 

injury related to a lawnmower. He was mowing the lawn for [employer], and 

suddenly encountered wasps.  He attempted to run away, and the lawnmower blade 

struck his posterior ankle, lacerating his Achilles tendon and causing an open 

fracture of the calcaneus. Fracture fragments are identified on the lateral x-

ray. The patient and I discussed the risks, benefits and alternatives of 

surgery. I recommended irrigation and debridement, and tendon repair. The 

fracture is minimal, and does not require any open treatment. The calcaneus 

fracture will be treated without manipulation or fixation. I did explain that 

there is a significant risk of infection due to the open laceration, and 

lawnmower blade injury.





Procedure in Detail: The patient was identified in the preoperative holding 

area.  The correct left lower extremity was marked by me.  The patient was 

taken to the operating room where general anesthesia was used.  The patient was 

positioned prone on the operating table with the bony prominences well-padded 

and the eyes carefully protected. Preoperative antibiotics were given 

intravenously.  A tourniquet was used on the left upper thigh. A timeout 

procedure was performed.  The left lower limb was washed and sterilized with 

Betadine scrub and then paint. Sterile drapes were applied.  The limb was 

elevated to exsanguinate it and the tourniquet was inflated to 300 mmHg.





The [employer]  was used to copiously irrigate the open 

laceration. I then performed a longitudinal incision using a 10 blade scalpel, 

proximally and distally along the Achilles tendon, to expose the tendon 

laceration and further expose for debridement. Fragments of bone were removed. 

Sharp dissection was used and excisional debridement was performed of skin, 

subcutaneous tissue, deep fascia, and bone. The tendon edges were debrided 

carefully with a rongeurs removing foreign material.





Next the tendon repair was performed. A single #2 FiberWire suture was used in 

a modified Kumar pattern. I had Zakiya reapproximate the tendon edges, and 

hold the tendon laceration reduced with plantar flexion of the foot. After the 

single modified Kumar suture, the main portions of the tendon were 

reapproximated but not well opposed. I then used #1 PDS in a running locking 

Krakw fashion, incorporating both edges of the tendon laceration, and repaired 

the tendon from side-to-side for secure apposition of the lacerated edges. A 

nice secure repair was obtained. The tendon tissue was normal in this case, as 

opposed to many Achilles ruptures which occur in degenerative tendon. After the 

secure repair, thorough irrigation was again used. Careful debridement was 

performed. 





The skin was now reapproximated with 2-0 PDS interrupted simple sutures. This 

was a complex laceration, 6 cm in width, and both the laceration as well as the 

surgical incision were reapproximated at the subcutaneous layer. This was 

repaired with minimal tension but was somewhat complex in the reapproximation 

of the multiple skin flaps. The tissue appears viable. 3-0 Prolene horizontal 

mattress and simple sutures were then used to reapproximate the skin edges. 





I had Zakiya complete the 3-0 Prolene skin closure. She then applied a Xeroform 

sterile dressing and a posterior splint with gravity dorsiflexion. The 

tourniquet was released. Needle and sponge counts were correct. There were no 

apparent complications. Results were discussed with the patient's grandmother. 

The plan is to continue antibiotics until tomorrow. He will be nonweightbearing 

initially. I will plan conversion to a Cam Walker in 2 weeks assuming there is 

no infection or need for further surgery.











LANDON TOURE MD Jun 21, 2017 16:19

## 2017-06-22 VITALS — DIASTOLIC BLOOD PRESSURE: 72 MMHG | SYSTOLIC BLOOD PRESSURE: 130 MMHG

## 2017-06-22 VITALS — DIASTOLIC BLOOD PRESSURE: 57 MMHG | SYSTOLIC BLOOD PRESSURE: 122 MMHG

## 2017-06-22 VITALS — DIASTOLIC BLOOD PRESSURE: 58 MMHG | SYSTOLIC BLOOD PRESSURE: 137 MMHG

## 2017-06-22 LAB
HCT VFR BLD CALC: 39.3 % (ref 39–53)
HGB BLD-MCNC: 13.5 G/DL (ref 13–17.5)
MCHC RBC AUTO-ENTMCNC: 34 G/DL (ref 31–37)

## 2017-06-22 RX ADMIN — HYDROCODONE BITARTRATE AND ACETAMINOPHEN PRN TAB: 10; 325 TABLET ORAL at 03:12

## 2017-06-22 RX ADMIN — HYDROCODONE BITARTRATE AND ACETAMINOPHEN PRN TAB: 10; 325 TABLET ORAL at 08:59

## 2017-06-22 RX ADMIN — HYDROCODONE BITARTRATE AND ACETAMINOPHEN PRN TAB: 10; 325 TABLET ORAL at 14:19

## 2017-06-22 RX ADMIN — MORPHINE SULFATE PRN MG: 4 INJECTION, SOLUTION INTRAMUSCULAR; INTRAVENOUS at 02:23

## 2017-06-22 RX ADMIN — MORPHINE SULFATE PRN MG: 4 INJECTION, SOLUTION INTRAMUSCULAR; INTRAVENOUS at 06:20

## 2017-06-22 NOTE — PDOC
PROGRESS NOTES


Subjective


Subjective


Doing well, no complaints.





Objective


Vital Signs





Vital Signs








  Date Time  Temp Pulse Resp B/P (MAP) Pulse Ox O2 Delivery O2 Flow Rate FiO2


 


6/22/17 11:00 98.1 61 18 137/58 (84) 98 Room Air  





 98.1       


 


6/21/17 14:39       10 








Physical Exam


Splint intact and dry. Toes NVI.


Labs





Laboratory Tests








Test


  6/20/17


15:10 6/21/17


07:20 6/22/17


05:10


 


White Blood Count


  11.0 x10^3/uL


(4.0-11.0) 9.6 x10^3/uL


(4.0-11.0) 


 


 


Red Blood Count


  4.77 x10^6/uL


(4.30-5.70) 4.58 x10^6/uL


(4.30-5.70) 


 


 


Hemoglobin


  14.6 g/dL


(13.0-17.5) 14.0 g/dL


(13.0-17.5) 13.5 g/dL


(13.0-17.5)


 


Hematocrit


  42.5 %


(39.0-53.0) 40.9 %


(39.0-53.0) 39.3 %


(39.0-53.0)


 


Mean Corpuscular Volume 89 fL ()  89 fL ()  


 


Mean Corpuscular Hemoglobin 31 pg (25-35)  31 pg (25-35)  


 


Mean Corpuscular Hemoglobin


Concent 35 g/dL


(31-37) 34 g/dL


(31-37) 34 g/dL


(31-37)


 


Red Cell Distribution Width


  13.4 %


(11.5-14.5) 13.2 %


(11.5-14.5) 


 


 


Platelet Count


  149 x10^3/uL


(140-400) 147 x10^3/uL


(140-400) 


 


 


Neutrophils (%) (Auto) 79 % (31-73)  68 % (31-73)  


 


Lymphocytes (%) (Auto) 14 % (24-48)  22 % (24-48)  


 


Monocytes (%) (Auto) 6 % (0-9)  9 % (0-9)  


 


Eosinophils (%) (Auto) 1 % (0-3)  2 % (0-3)  


 


Basophils (%) (Auto) 1 % (0-3)  1 % (0-3)  


 


Neutrophils # (Auto)


  8.6 x10^3uL


(1.8-7.7) 6.5 x10^3uL


(1.8-7.7) 


 


 


Lymphocytes # (Auto)


  1.6 x10^3/uL


(1.0-4.8) 2.1 x10^3/uL


(1.0-4.8) 


 


 


Monocytes # (Auto)


  0.6 x10^3/uL


(0.0-1.1) 0.8 x10^3/uL


(0.0-1.1) 


 


 


Eosinophils # (Auto)


  0.1 x10^3/uL


(0.0-0.7) 0.2 x10^3/uL


(0.0-0.7) 


 


 


Basophils # (Auto)


  0.1 x10^3/uL


(0.0-0.2) 0.1 x10^3/uL


(0.0-0.2) 


 


 


Sodium Level


  140 mmol/L


(136-145) 142 mmol/L


(136-145) 


 


 


Potassium Level


  4.1 mmol/L


(3.5-5.1) 3.6 mmol/L


(3.5-5.1) 


 


 


Chloride Level


  104 mmol/L


() 105 mmol/L


() 


 


 


Carbon Dioxide Level


  27 mmol/L


(21-32) 29 mmol/L


(21-32) 


 


 


Anion Gap 9 (6-14)  8 (6-14)  


 


Blood Urea Nitrogen


  18 mg/dL


(8-26) 17 mg/dL


(8-26) 


 


 


Creatinine


  1.2 mg/dL


(0.7-1.3) 1.2 mg/dL


(0.7-1.3) 


 


 


Estimated GFR


(Cockcroft-Gault) 90.8 


  90.8 


  


 


 


Glucose Level


  87 mg/dL


(70-99) 86 mg/dL


(70-99) 


 


 


Calcium Level


  9.2 mg/dL


(8.5-10.1) 8.7 mg/dL


(8.5-10.1) 


 


 


Prothrombin Time


  


  15.2 SEC


(11.7-14.0) 


 


 


Prothromb Time International


Ratio 


  1.3 (0.8-1.1) 


  


 


 


Activated Partial


Thromboplast Time 


  34 SEC (24-38) 


  


 








Laboratory Tests








Test


  6/22/17


05:10


 


Hemoglobin


  13.5 g/dL


(13.0-17.5)


 


Hematocrit


  39.3 %


(39.0-53.0)


 


Mean Corpuscular Hemoglobin


Concent 34 g/dL


(31-37)











Assessment


Assessment


POD#1 Achilles tendon laceration repair, open calcaneus fracture debridement.


Problems:  





Plan


Plan of Care


Ok for discharge home today. 


Follow up my office 7/6











LANDON JULES MD Jun 22, 2017 12:47

## 2017-06-22 NOTE — CONS
DATE OF CONSULTATION:  06/21/2017



REFERRING PHYSICIANS:  Dr. Curt Toure, Dr. Mariaelena Rios, Dr. Marlon Read, Dr. Kimberly Brito.



Thank you for consult.



CHIEF COMPLAINT:  Left leg pain, diagnosed with left Achilles tendon laceration.



HISTORY OF PRESENT ILLNESS:  This is a 22-year-old male who was working with a

 and ran into a nest of wasps.  He attempted to get away, but 
subsequently had

a laceration secondary to  to the left Achilles tendon area.  He

denies other injuries.  He is scheduled for repair per Orthopedics later today. 

He is seen in this hospital room, appears to be comfortable.



ALLERGIES:  He has no known drug allergies.



MEDICATIONS:  None.



PAST MEDICAL HISTORY:  None.



PAST SURGICAL HISTORY:  He has had some orthopedic injuries to his bilateral

lower arms and hands.



SOCIAL HISTORY:  Positive for marijuana, but no other drugs.



REVIEW OF SYSTEMS:  All systems reviewed and negative except for HPI.



PHYSICAL EXAMINATION: 

GENERAL:  Well-developed, well-nourished male, in no obvious distress.

VITAL SIGNS:  He is afebrile.  Vital signs within normal limits.

HEENT:  Normocephalic, atraumatic.  Extraocular motions intact.  Anicteric

sclerae.  No midface instability.  Oropharynx clear.  No mucosal lesions.  Moist

mucosa.

NECK:  Supple.  No C-spine step-off or tenderness to palpation.  Trachea is

midline.

CHEST:  Bilateral chest excursion.  No chest wall tenderness to palpation.

ABDOMEN:  Soft, nondistended, nontender to palpation.

EXTREMITIES:  He moves all upper and lower extremities and has good sensation

throughout.  He has a dressing on his left heel.



IMAGING:  Ankle and foot x-ray demonstrates a large laceration superior to the

calcaneus at the attachment of the Achilles tendon, subtle bone fragments.



His labs are reviewed and essentially unremarkable.



IMPRESSION AND RECOMMENDATIONS:  A 22-year-old male, status post isolated left

ankle laceration.  I agree with plans for repair per Orthopedics.  No other

injuries are obviously identified.  We will sign off currently, but certainly be

available if additional intervention is necessary.



Thank you for allowing participation in the care of this pleasant patient.

 



______________________________

JOSE FARFAN MD



DR:  LAURI/ben  JOB#:  [id number] / [id number]

DD:  06/21/2017 11:02  DT:  06/22/2017 04:18



KIMBERLY Willingham URSULA MD Vani, Curt READ, MARLON PATEL

## 2017-06-24 NOTE — DS
DATE OF DISCHARGE:  06/22/2017



CHIEF COMPLAINT:  Achilles tendon laceration.



HOSPITAL COURSE:  The patient is a 22-year-old -American gentleman who

sustained an Achilles tendon laceration on the left after having an accident

with a lawnmower.  He was admitted, was seen by Dr. Toure from [facility] who took him

to surgery on 06/21/2017.  The patient tolerated the procedure well.  Pain

control was achieved with both IV and p.o. pain medications and the patient was

stable for discharge to home on 06/22/2017.



PHYSICAL EXAMINATION:

VITAL SIGNS:  Show a blood pressure of 137/58, heart rate of 61, respiratory

rate at 18.  He is afebrile.

GENERAL:  This is a 22-year-old -American gentleman, slim, alert and

oriented, in no acute distress.

LUNGS:  Clear.

HEART:  Regular rate and rhythm.

ABDOMEN:  Has positive bowel sounds.

EXTREMITIES:  Show no edema.  Left lower extremity is wrapped in gauze and

splint.



DISCHARGE DIAGNOSIS:  Achilles tendon laceration, status post repair on

06/21/2017.



DISCHARGE DISPOSITION:  To home.



DISCHARGE CONDITION:  Improved.



DISCHARGE MEDICATIONS:  Please refer to MAR.



DISCHARGE INSTRUCTIONS:  The patient will follow up with Dr. Toure in 10 days. 

He will see his PCP when he has recovered.

 



______________________________

MIRIAM TOWNSEND MD



DR:  NATO/nts  JOB#:  [id number] / [id number]

DD:  06/23/2017 17:10  DT:  06/24/2017 00:19



BAYLEE Willingham

## 2017-07-24 ENCOUNTER — HOSPITAL ENCOUNTER (OUTPATIENT)
Dept: HOSPITAL 61 - PMGWOUND | Age: 22
Discharge: HOME | End: 2017-07-24
Attending: EMERGENCY MEDICINE
Payer: SELF-PAY

## 2017-07-24 VITALS — DIASTOLIC BLOOD PRESSURE: 67 MMHG | SYSTOLIC BLOOD PRESSURE: 142 MMHG
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
## 2017-07-24 DIAGNOSIS — F12.90: ICD-10-CM
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
## 2017-07-24 DIAGNOSIS — M86.9: ICD-10-CM

## 2017-07-24 DIAGNOSIS — S86.021A: Primary | ICD-10-CM

## 2017-07-24 PROCEDURE — 97605 NEG PRS WND THER DME<=50SQCM: CPT

## 2017-07-24 PROCEDURE — 99202 OFFICE O/P NEW SF 15 MIN: CPT

## 2017-07-26 ENCOUNTER — HOSPITAL ENCOUNTER (OUTPATIENT)
Dept: HOSPITAL 61 - PMGWOUND | Age: 22
Discharge: HOME | End: 2017-07-26
Attending: FAMILY MEDICINE
Payer: COMMERCIAL

## 2017-07-26 VITALS — SYSTOLIC BLOOD PRESSURE: 126 MMHG | DIASTOLIC BLOOD PRESSURE: 60 MMHG

## 2017-07-26 DIAGNOSIS — M86.9: ICD-10-CM

## 2017-07-26 DIAGNOSIS — S86.021D: Primary | ICD-10-CM
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
## 2017-07-26 DIAGNOSIS — F12.90: ICD-10-CM

## 2017-07-26 PROCEDURE — 97605 NEG PRS WND THER DME<=50SQCM: CPT

## 2017-07-28 ENCOUNTER — HOSPITAL ENCOUNTER (OUTPATIENT)
Dept: HOSPITAL 61 - PMGWOUND | Age: 22
Discharge: HOME | End: 2017-07-28
Attending: FAMILY MEDICINE
Payer: COMMERCIAL

## 2017-07-28 ENCOUNTER — HOSPITAL ENCOUNTER (OUTPATIENT)
Dept: HOSPITAL 61 - OPS | Age: 22
Discharge: HOME | End: 2017-07-28
Attending: ORTHOPAEDIC SURGERY
Payer: SELF-PAY

## 2017-07-28 VITALS
DIASTOLIC BLOOD PRESSURE: 67 MMHG | SYSTOLIC BLOOD PRESSURE: 141 MMHG | DIASTOLIC BLOOD PRESSURE: 67 MMHG | SYSTOLIC BLOOD PRESSURE: 141 MMHG

## 2017-07-28 DIAGNOSIS — Z98.890: Primary | ICD-10-CM

## 2017-07-28 DIAGNOSIS — F12.90: ICD-10-CM
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
## 2017-07-28 DIAGNOSIS — M86.9: ICD-10-CM

## 2017-07-28 DIAGNOSIS — S86.021D: Primary | ICD-10-CM

## 2017-07-28 PROCEDURE — 85651 RBC SED RATE NONAUTOMATED: CPT

## 2017-07-28 PROCEDURE — 86140 C-REACTIVE PROTEIN: CPT

## 2017-07-28 PROCEDURE — 36415 COLL VENOUS BLD VENIPUNCTURE: CPT

## 2017-07-28 PROCEDURE — 97605 NEG PRS WND THER DME<=50SQCM: CPT

## 2017-07-31 ENCOUNTER — HOSPITAL ENCOUNTER (OUTPATIENT)
Dept: HOSPITAL 61 - PMGWOUND | Age: 22
Discharge: HOME | End: 2017-07-31
Attending: EMERGENCY MEDICINE
Payer: COMMERCIAL

## 2017-07-31 VITALS — SYSTOLIC BLOOD PRESSURE: 128 MMHG | DIASTOLIC BLOOD PRESSURE: 69 MMHG | DIASTOLIC BLOOD PRESSURE: 69 MMHG

## 2017-07-31 DIAGNOSIS — F12.90: ICD-10-CM

## 2017-07-31 DIAGNOSIS — M86.9: ICD-10-CM

## 2017-07-31 DIAGNOSIS — S86.021D: Primary | ICD-10-CM
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
## 2017-07-31 PROCEDURE — 97605 NEG PRS WND THER DME<=50SQCM: CPT

## 2017-08-01 VITALS — SYSTOLIC BLOOD PRESSURE: 132 MMHG

## 2017-08-02 ENCOUNTER — HOSPITAL ENCOUNTER (OUTPATIENT)
Dept: HOSPITAL 61 - PMGWOUND | Age: 22
Discharge: HOME | End: 2017-08-02
Attending: EMERGENCY MEDICINE
Payer: COMMERCIAL

## 2017-08-02 DIAGNOSIS — F12.90: ICD-10-CM
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
## 2017-08-02 DIAGNOSIS — M86.9: ICD-10-CM

## 2017-08-02 DIAGNOSIS — S86.021D: Primary | ICD-10-CM

## 2017-08-02 PROCEDURE — 97605 NEG PRS WND THER DME<=50SQCM: CPT

## 2017-08-04 ENCOUNTER — HOSPITAL ENCOUNTER (OUTPATIENT)
Dept: HOSPITAL 61 - PMGWOUND | Age: 22
Discharge: HOME | End: 2017-08-04
Attending: FAMILY MEDICINE
Payer: COMMERCIAL

## 2017-08-04 VITALS — DIASTOLIC BLOOD PRESSURE: 57 MMHG | SYSTOLIC BLOOD PRESSURE: 128 MMHG
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
## 2017-08-04 DIAGNOSIS — S86.022D: Primary | ICD-10-CM

## 2017-08-04 DIAGNOSIS — R73.9: ICD-10-CM

## 2017-08-04 DIAGNOSIS — M86.9: ICD-10-CM

## 2017-08-04 PROCEDURE — 97605 NEG PRS WND THER DME<=50SQCM: CPT

## 2017-08-07 ENCOUNTER — HOSPITAL ENCOUNTER (OUTPATIENT)
Dept: HOSPITAL 61 - PMGWOUND | Age: 22
Discharge: HOME | End: 2017-08-07
Attending: EMERGENCY MEDICINE
Payer: COMMERCIAL

## 2017-08-07 VITALS — DIASTOLIC BLOOD PRESSURE: 60 MMHG | SYSTOLIC BLOOD PRESSURE: 132 MMHG
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
## 2017-08-07 DIAGNOSIS — M86.9: ICD-10-CM

## 2017-08-07 DIAGNOSIS — F12.90: ICD-10-CM

## 2017-08-07 DIAGNOSIS — S86.021D: Primary | ICD-10-CM

## 2017-08-07 PROCEDURE — 97605 NEG PRS WND THER DME<=50SQCM: CPT

## 2017-08-09 ENCOUNTER — HOSPITAL ENCOUNTER (OUTPATIENT)
Dept: HOSPITAL 61 - PMGWOUND | Age: 22
Discharge: HOME | End: 2017-08-09
Attending: FAMILY MEDICINE
Payer: COMMERCIAL

## 2017-08-09 VITALS — SYSTOLIC BLOOD PRESSURE: 131 MMHG | DIASTOLIC BLOOD PRESSURE: 59 MMHG
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
## 2017-08-09 DIAGNOSIS — M86.68: ICD-10-CM

## 2017-08-09 DIAGNOSIS — S91.001D: Primary | ICD-10-CM

## 2017-08-09 PROCEDURE — 97605 NEG PRS WND THER DME<=50SQCM: CPT

## 2017-08-11 ENCOUNTER — HOSPITAL ENCOUNTER (OUTPATIENT)
Dept: HOSPITAL 61 - PMGWOUND | Age: 22
Discharge: HOME | End: 2017-08-11
Attending: FAMILY MEDICINE
Payer: COMMERCIAL

## 2017-08-11 VITALS — SYSTOLIC BLOOD PRESSURE: 132 MMHG | DIASTOLIC BLOOD PRESSURE: 61 MMHG

## 2017-08-11 DIAGNOSIS — M86.8X8: ICD-10-CM
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
## 2017-08-11 DIAGNOSIS — S86.022D: Primary | ICD-10-CM

## 2017-08-11 PROCEDURE — 97605 NEG PRS WND THER DME<=50SQCM: CPT

## 2017-08-14 ENCOUNTER — HOSPITAL ENCOUNTER (OUTPATIENT)
Dept: HOSPITAL 61 - PMGWOUND | Age: 22
Discharge: HOME | End: 2017-08-14
Attending: EMERGENCY MEDICINE
Payer: COMMERCIAL

## 2017-08-14 VITALS — DIASTOLIC BLOOD PRESSURE: 74 MMHG | SYSTOLIC BLOOD PRESSURE: 122 MMHG

## 2017-08-14 DIAGNOSIS — S86.022D: Primary | ICD-10-CM

## 2017-08-14 DIAGNOSIS — M86.68: ICD-10-CM
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
## 2017-08-14 PROCEDURE — 97605 NEG PRS WND THER DME<=50SQCM: CPT

## 2017-08-16 ENCOUNTER — HOSPITAL ENCOUNTER (OUTPATIENT)
Dept: HOSPITAL 61 - PMGWOUND | Age: 22
Discharge: HOME | End: 2017-08-16
Attending: FAMILY MEDICINE
Payer: COMMERCIAL

## 2017-08-16 VITALS — DIASTOLIC BLOOD PRESSURE: 59 MMHG | SYSTOLIC BLOOD PRESSURE: 127 MMHG

## 2017-08-16 DIAGNOSIS — M86.68: ICD-10-CM

## 2017-08-16 DIAGNOSIS — F12.90: ICD-10-CM

## 2017-08-16 DIAGNOSIS — Z79.82: ICD-10-CM
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
## 2017-08-16 DIAGNOSIS — S86.022D: Primary | ICD-10-CM

## 2017-08-16 DIAGNOSIS — Z79.2: ICD-10-CM

## 2017-08-16 PROCEDURE — 97605 NEG PRS WND THER DME<=50SQCM: CPT

## 2017-08-18 ENCOUNTER — HOSPITAL ENCOUNTER (OUTPATIENT)
Dept: HOSPITAL 61 - PMGWOUND | Age: 22
Discharge: HOME | End: 2017-08-18
Attending: FAMILY MEDICINE
Payer: COMMERCIAL

## 2017-08-18 VITALS — SYSTOLIC BLOOD PRESSURE: 135 MMHG | DIASTOLIC BLOOD PRESSURE: 72 MMHG

## 2017-08-18 DIAGNOSIS — M86.68: ICD-10-CM

## 2017-08-18 DIAGNOSIS — Z79.82: ICD-10-CM

## 2017-08-18 DIAGNOSIS — Z79.2: ICD-10-CM

## 2017-08-18 DIAGNOSIS — S86.022D: Primary | ICD-10-CM
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
## 2017-08-18 DIAGNOSIS — F12.90: ICD-10-CM

## 2017-08-18 PROCEDURE — 97605 NEG PRS WND THER DME<=50SQCM: CPT

## 2017-08-21 ENCOUNTER — HOSPITAL ENCOUNTER (OUTPATIENT)
Dept: HOSPITAL 61 - PMGWOUND | Age: 22
Discharge: HOME | End: 2017-08-21
Attending: EMERGENCY MEDICINE
Payer: COMMERCIAL

## 2017-08-21 VITALS — DIASTOLIC BLOOD PRESSURE: 57 MMHG | SYSTOLIC BLOOD PRESSURE: 120 MMHG

## 2017-08-21 DIAGNOSIS — S86.022D: Primary | ICD-10-CM
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
## 2017-08-21 DIAGNOSIS — M86.68: ICD-10-CM

## 2017-08-21 PROCEDURE — 99213 OFFICE O/P EST LOW 20 MIN: CPT

## 2017-08-23 ENCOUNTER — HOSPITAL ENCOUNTER (OUTPATIENT)
Dept: HOSPITAL 61 - PMGWOUND | Age: 22
Discharge: HOME | End: 2017-08-23
Attending: FAMILY MEDICINE
Payer: COMMERCIAL

## 2017-08-23 VITALS — DIASTOLIC BLOOD PRESSURE: 73 MMHG | SYSTOLIC BLOOD PRESSURE: 139 MMHG
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
## 2017-08-23 DIAGNOSIS — F12.90: ICD-10-CM

## 2017-08-23 DIAGNOSIS — S86.022D: Primary | ICD-10-CM

## 2017-08-23 DIAGNOSIS — Z79.82: ICD-10-CM

## 2017-08-23 DIAGNOSIS — M86.68: ICD-10-CM

## 2017-08-23 DIAGNOSIS — Z79.2: ICD-10-CM

## 2017-08-23 PROCEDURE — 97605 NEG PRS WND THER DME<=50SQCM: CPT

## 2017-08-25 ENCOUNTER — HOSPITAL ENCOUNTER (OUTPATIENT)
Dept: HOSPITAL 61 - PMGWOUND | Age: 22
Discharge: HOME | End: 2017-08-25
Attending: FAMILY MEDICINE
Payer: COMMERCIAL

## 2017-08-25 VITALS — SYSTOLIC BLOOD PRESSURE: 105 MMHG | DIASTOLIC BLOOD PRESSURE: 65 MMHG

## 2017-08-25 DIAGNOSIS — M86.68: ICD-10-CM
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
## 2017-08-25 DIAGNOSIS — S91.002D: Primary | ICD-10-CM

## 2017-08-25 DIAGNOSIS — F12.90: ICD-10-CM

## 2017-08-25 PROCEDURE — 97605 NEG PRS WND THER DME<=50SQCM: CPT

## 2017-08-27 ENCOUNTER — HOSPITAL ENCOUNTER (EMERGENCY)
Dept: HOSPITAL 61 - ER | Age: 22
Discharge: HOME | End: 2017-08-27
Payer: COMMERCIAL

## 2017-08-27 VITALS
SYSTOLIC BLOOD PRESSURE: 137 MMHG | DIASTOLIC BLOOD PRESSURE: 66 MMHG | SYSTOLIC BLOOD PRESSURE: 137 MMHG | DIASTOLIC BLOOD PRESSURE: 66 MMHG

## 2017-08-27 VITALS — BODY MASS INDEX: 21.25 KG/M2 | WEIGHT: 180 LBS | HEIGHT: 77 IN

## 2017-08-27 DIAGNOSIS — T81.89XA: Primary | ICD-10-CM
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
## 2017-08-27 PROCEDURE — 99284 EMERGENCY DEPT VISIT MOD MDM: CPT

## 2017-08-27 NOTE — PHYS DOC
Past Medical History


Past Medical History:  No Pertinent History


Past Surgical History:  No Surgical History


Additional Past Surgical Histo:  RIGHT HAND WITH PLATE AND SCREWS


Alcohol Use:  None


Drug Use:  None





Adult General


Chief Complaint


Chief Complaint:  OTHER COMPLAINTS





HPI


HPI





Patient is a 22  year old M who presents with wound VAC complications to the 

left lower leg. Patient states his dog snag his wound VAC cord on the left 

lower leg and pulled it out. Patient had a lawnmower injury and which he 

lacerated his Achilles tendon and sustained injury to the left lower extremity 

and now has a wound VAC with a splint on it.  Patient came in to have the host 

back up to the wound VAC in the seal reapplied. Patient sustained no other 

injuries. Patient has no other complaints.





Review of Systems


Review of Systems


GEN: Denies fevers, chills, sweats


HEENT: Denies blurred vision, sore throat


CV: Denies chest pain


RESP: Denies shortness of air, cough


GI: Denies n/v/d


NEURO: Denies confusion, dizziness


MSK: Denies weakness, joint pain/swelling





Allergies


Allergies





Allergies








Coded Allergies Type Severity Reaction Last Updated Verified


 


  No Known Drug Allergies    8/27/17 No











Physical Exam


Physical Exam


GEN.:    No apparent distress.  Alert and oriented.


HEENT:    Head is normocephalic, atraumatic


NECK:    Supple.  


LUNGS:    CTAB.


HEART:    RRR, S1, S2 present.  Peripheral pulses intact


ABDOMEN:    Soft, nontender.  Positive bowel sounds.


EXTREMITIES:    Without any cyanosis. Wound VAC placed to the left lower 

extremity over the midshaft of fibula with a posterior splint applied. Tubing 

was re-hooked up and a Tegaderm sealant was replaced and the wound VAC was 

turned back on without any couple complications    


NEUROLOGIC:     Normal speech, normal tone


PSYCHIATRIC:    Normal affect, normal mood.


SKIN:   No ulcerations





Current Patient Data


Vital Signs





 Vital Signs








  Date Time  Temp Pulse Resp B/P (MAP) Pulse Ox O2 Delivery O2 Flow Rate FiO2


 


8/27/17 10:56 97.8 62 18 137/66 (89) 98 Room Air  





 97.8       











EKG


EKG


[]





Radiology/Procedures


Radiology/Procedures


[]





Course & Med Decision Making


Course & Med Decision Making


Pertinent Labs and Imaging studies reviewed. (See chart for details)





MDM:


After reviewing the chart, CC/HPI/PMH, physical exam, I do not believe the 

patient has emergent medical condition warranting further workup and/or 

admission at this time. Patient's wound VAC was fixed in the emergency room as 

working properly now. Patient's left lower leg was rewrapped by the nurse. 

Patient received his antibiotic infusion while in the emergency room stating 

his wound VAC tended to. Patient is stable for discharge. Additional verbal 

discharge instructions were provided to the patient and that if symptoms get 

worse or any new symptoms arise that are worrisome to the patient he is to 

return to the emergency room immediately


 





[]





Dragon Disclaimer


Dragon Disclaimer


This electronic medical record was generated, in whole or in part, using a 

voice recognition dictation system.





Departure


Departure


Impression:  


 Primary Impression:  


 Encounter for management of vacuum-assisted closure (VAC) of wound


Disposition:  01 HOME, SELF-CARE


Condition:  IMPROVED


Referrals:  


BAYLEE NICHOLS (PCP)


Patient Instructions:  Wound Care, Easy-to-Read





Additional Instructions:  


Please follow up with her family doctor next one to 2 days for further 

evaluation and management of that wound VAC











OLIVIER GROVER DO Aug 27, 2017 11:53

## 2017-08-28 ENCOUNTER — HOSPITAL ENCOUNTER (OUTPATIENT)
Dept: HOSPITAL 61 - PCVCWOUND | Age: 22
Discharge: HOME | End: 2017-08-28
Attending: EMERGENCY MEDICINE
Payer: COMMERCIAL

## 2017-08-28 DIAGNOSIS — F12.90: ICD-10-CM

## 2017-08-28 DIAGNOSIS — S86.022D: Primary | ICD-10-CM

## 2017-08-28 DIAGNOSIS — Z79.82: ICD-10-CM

## 2017-08-28 DIAGNOSIS — Z79.1: ICD-10-CM
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
## 2017-08-28 DIAGNOSIS — M86.68: ICD-10-CM

## 2017-08-28 DIAGNOSIS — Z79.2: ICD-10-CM

## 2017-08-28 PROCEDURE — 11042 DBRDMT SUBQ TIS 1ST 20SQCM/<: CPT

## 2017-08-28 PROCEDURE — 97605 NEG PRS WND THER DME<=50SQCM: CPT

## 2017-08-29 VITALS — DIASTOLIC BLOOD PRESSURE: 65 MMHG | SYSTOLIC BLOOD PRESSURE: 124 MMHG

## 2017-08-30 ENCOUNTER — HOSPITAL ENCOUNTER (OUTPATIENT)
Dept: HOSPITAL 61 - PMGWOUND | Age: 22
Discharge: HOME | End: 2017-08-30
Attending: FAMILY MEDICINE
Payer: COMMERCIAL

## 2017-08-30 DIAGNOSIS — Z79.82: ICD-10-CM

## 2017-08-30 DIAGNOSIS — S86.022D: Primary | ICD-10-CM

## 2017-08-30 DIAGNOSIS — Z79.1: ICD-10-CM

## 2017-08-30 DIAGNOSIS — F12.90: ICD-10-CM

## 2017-08-30 DIAGNOSIS — M86.68: ICD-10-CM
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
## 2017-08-30 PROCEDURE — 97605 NEG PRS WND THER DME<=50SQCM: CPT

## 2017-09-01 ENCOUNTER — HOSPITAL ENCOUNTER (OUTPATIENT)
Dept: HOSPITAL 61 - PMGWOUND | Age: 22
Discharge: HOME | End: 2017-09-01
Attending: FAMILY MEDICINE
Payer: COMMERCIAL

## 2017-09-01 ENCOUNTER — HOSPITAL ENCOUNTER (OUTPATIENT)
Dept: HOSPITAL 61 - LAB | Age: 22
Discharge: HOME | End: 2017-09-01
Attending: EMERGENCY MEDICINE
Payer: COMMERCIAL

## 2017-09-01 VITALS
SYSTOLIC BLOOD PRESSURE: 145 MMHG | DIASTOLIC BLOOD PRESSURE: 64 MMHG | DIASTOLIC BLOOD PRESSURE: 64 MMHG | SYSTOLIC BLOOD PRESSURE: 145 MMHG

## 2017-09-01 DIAGNOSIS — M86.68: ICD-10-CM

## 2017-09-01 DIAGNOSIS — S86.022D: Primary | ICD-10-CM
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
## 2017-09-01 DIAGNOSIS — F12.90: ICD-10-CM

## 2017-09-01 LAB
ANION GAP SERPL CALC-SCNC: 6 MMOL/L (ref 6–14)
BUN SERPL-MCNC: 15 MG/DL (ref 8–26)
CALCIUM SERPL-MCNC: 9.5 MG/DL (ref 8.5–10.1)
CHLORIDE SERPL-SCNC: 104 MMOL/L (ref 98–107)
CO2 SERPL-SCNC: 31 MMOL/L (ref 21–32)
CREAT SERPL-MCNC: 1 MG/DL (ref 0.7–1.3)
GFR SERPLBLD BASED ON 1.73 SQ M-ARVRAT: 112 ML/MIN
GLUCOSE SERPL-MCNC: 92 MG/DL (ref 70–99)
POTASSIUM SERPL-SCNC: 4.3 MMOL/L (ref 3.5–5.1)
SODIUM SERPL-SCNC: 141 MMOL/L (ref 136–145)

## 2017-09-01 PROCEDURE — 99214 OFFICE O/P EST MOD 30 MIN: CPT

## 2017-09-01 PROCEDURE — 36415 COLL VENOUS BLD VENIPUNCTURE: CPT

## 2017-09-01 PROCEDURE — 80048 BASIC METABOLIC PNL TOTAL CA: CPT

## 2017-09-05 NOTE — PDOC1
History and Physical


Date of Admission


Date of Admission


DATE: 9/6/17





Identification/Chief Complaint


Chief Complaint


left achilles tendon


Problems:  





Source


Source:  Chart review





History of Present Illness


History of Present Illness


Bassam is a 22 year old male who had left achilles tendon repair on 6/21/17. He 

had a debridement and wound vac placed on 07.14.17, which is still in place. 

The outpatient wound care clinic has been changing his wound vac three times 

per week. He just discontinued IV antibiotics and began oral Levaquin. He is 

nonweightbearing on the left lower extremity with crutches.





Past Medical History


Cardiovascular:  No pertinent hx


Pulmonary:  No pertinent hx


Heme/Onc:  No pertinent hx





Past Surgical History


Past Surgical History:  Other (left achilles tendon repair 6/21/17 and 

debridement with wound vac placement on 7/14/17)





Family History


Family History:  Hypertension





Social History


Smoke:  No


ALCOHOL:  none


Drugs:  None





Current Medications


Current Medications





Current Medications


Ondansetron HCl (Zofran) 4 mg PRN Q6HRS  PRN IV NAUSEA/VOMITING;  Start 9/6/17 

at 07:00;  Stop 9/7/17 at 06:59


Fentanyl Citrate (Fentanyl 2ml Vial) 25 mcg PRN Q5MIN  PRN IV MILD PAIN;  Start 

9/6/17 at 07:00;  Stop 9/7/17 at 06:59


Fentanyl Citrate (Fentanyl 2ml Vial) 50 mcg PRN Q5MIN  PRN IV MODERATE PAIN;  

Start 9/6/17 at 07:00;  Stop 9/7/17 at 06:59


Morphine Sulfate 1 mg PRN Q10MIN  PRN IV SEVERE PAIN;  Start 9/6/17 at 07:00;  

Stop 9/7/17 at 06:59


Ringer's Solution 1,000 ml @  30 mls/hr Q24H IV ;  Start 9/6/17 at 07:00;  Stop 

9/6/17 at 18:59


Lidocaine HCl 2 ml PRN 1X  PRN ID PRIOR TO IV START;  Start 9/6/17 at 07:00;  

Stop 9/7/17 at 06:59


Hydromorphone HCl (Dilaudid) 0.5 mg PRN Q10MIN  PRN IV SEV PAIN, Second choice;

  Start 9/6/17 at 07:00;  Stop 9/7/17 at 06:59


Prochlorperazine Edisylate (Compazine) 5 mg PACU PRN  PRN IV NAUSEA, MRX1;  

Start 9/6/17 at 07:00;  Stop 9/7/17 at 06:59





Active Scripts


Active


Reported


Keflex (Cephalexin) 500 Mg Capsule 1 Cap PO QID





Allergies


Allergies:  


Coded Allergies:  


     No Known Drug Allergies (Unverified , 9/1/17)





Physical Exam


General:  Alert, Oriented X3, Cooperative, No acute distress


HEENT:  Atraumatic, EOMI


Lungs:  Normal air movement


Heart:  RRR


Abdomen:  Soft


Extremities:  No clubbing, No cyanosis, No edema, Normal pulses, Other (

Neurovascularly intact distally. Wound Vac in place. No drainage, erythema, 

swelling. Posterior splint in place with foot in neutral position. )


Skin:  No rashes


Neuro:  Normal speech, Sensation intact


Psych/Mental Status:  Mental status NL, Mood NL





VTE Prophylaxis Ordered


VTE Prophylaxis Devices:  Contraindicated


VTE Pharmacological Prophylaxi:  Yes





Assessment/Plan


Assessment/Plan


We will schedule suture removal, debridement and STSG skin graft with Dr. Lee 

on Tuesday 9/6/17. He can discontinue IV antibiotics, I will start him on oral 

antibiotics that he should continue as long as wound vac is in place. Dr. Toure 

explained that Levaquin may cause tendon weakness, but it is necessary to 

control and prevent infection. We will discuss a boot in the future, but he 

should hold off on wearing one for now. Dr. Toure contacted Dr. Lee about the 

case, and he agrees.











ALYSON MCGARRY Sep 5, 2017 11:59

## 2017-09-06 ENCOUNTER — HOSPITAL ENCOUNTER (OUTPATIENT)
Dept: HOSPITAL 61 - SURG | Age: 22
Discharge: HOME | End: 2017-09-06
Attending: ORTHOPAEDIC SURGERY
Payer: COMMERCIAL

## 2017-09-06 VITALS
DIASTOLIC BLOOD PRESSURE: 54 MMHG | DIASTOLIC BLOOD PRESSURE: 54 MMHG | DIASTOLIC BLOOD PRESSURE: 54 MMHG | DIASTOLIC BLOOD PRESSURE: 54 MMHG | DIASTOLIC BLOOD PRESSURE: 54 MMHG | DIASTOLIC BLOOD PRESSURE: 54 MMHG | DIASTOLIC BLOOD PRESSURE: 54 MMHG | SYSTOLIC BLOOD PRESSURE: 106 MMHG | SYSTOLIC BLOOD PRESSURE: 106 MMHG | SYSTOLIC BLOOD PRESSURE: 106 MMHG | SYSTOLIC BLOOD PRESSURE: 106 MMHG | SYSTOLIC BLOOD PRESSURE: 106 MMHG | SYSTOLIC BLOOD PRESSURE: 106 MMHG | DIASTOLIC BLOOD PRESSURE: 54 MMHG | DIASTOLIC BLOOD PRESSURE: 54 MMHG | DIASTOLIC BLOOD PRESSURE: 54 MMHG | SYSTOLIC BLOOD PRESSURE: 106 MMHG | SYSTOLIC BLOOD PRESSURE: 106 MMHG | SYSTOLIC BLOOD PRESSURE: 106 MMHG | SYSTOLIC BLOOD PRESSURE: 106 MMHG | SYSTOLIC BLOOD PRESSURE: 106 MMHG | SYSTOLIC BLOOD PRESSURE: 106 MMHG | DIASTOLIC BLOOD PRESSURE: 54 MMHG | DIASTOLIC BLOOD PRESSURE: 54 MMHG

## 2017-09-06 VITALS — WEIGHT: 180 LBS | HEIGHT: 77 IN | BODY MASS INDEX: 21.25 KG/M2
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
## 2017-09-06 DIAGNOSIS — Z87.39: ICD-10-CM

## 2017-09-06 DIAGNOSIS — S86.092A: Primary | ICD-10-CM
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
## 2017-09-06 PROCEDURE — C1768 GRAFT, VASCULAR: HCPCS

## 2017-09-06 PROCEDURE — 15100 SPLT AGRFT T/A/L 1ST 100SQCM: CPT

## 2017-09-06 RX ADMIN — FENTANYL CITRATE PRN MCG: 50 INJECTION INTRAMUSCULAR; INTRAVENOUS at 09:37

## 2017-09-06 RX ADMIN — FENTANYL CITRATE PRN MCG: 50 INJECTION INTRAMUSCULAR; INTRAVENOUS at 09:24

## 2017-09-06 NOTE — PDOC
BRIEF OPERATIVE NOTE


Date:  Sep 6, 2017


Pre-Op Diagnosis


left achilles open wound


Post-Op Diagnosis


same


Procedure Performed


left achilles debridement and split thickness skin graft


Surgeon


Jesus Toure


Assistant


EAMON Ruiz


Anesthesiologist


Christiano


Anesthesia Type:  General


Blood Loss


minimal


Specimens Obtained


none


Findings


as above


Complications


none











ALYSON RUIZ Sep 6, 2017 09:09

## 2017-09-06 NOTE — PDOC4
OPERATIVE NOTE


Date:


Date:  Sep 6, 2017





Pre-Op Diagnosis:


left heel non healing wound





Post-Op Diagnosis:


same





Procedure Performed:


Split thickness skin graft 15 cm surface area





Surgeon:


Epi Farfan





Anesthesia Type:


GETA





Blood Loss:


minimal





Specimans Obtained:


none





Findings:


Good granulation tissue pending Dr. Toure's debridement





Complications:


none





Operative Note:


Asked by Dr. Toure to assist with STSG after debridement.  Patient was brought 

to the OR, induced under GETA and prepped in the usual fashion over the left 

posterior heel and left posterior thigh.  A debridement overlying the achilles 

repair was performed by Dr. Toure with good viable underlying tissue.  No 

exposed tendon and good granulation tissue.  A STSG was harvested from the 

posterior left thigh.  The donor site was treated with 0.5% marcaine and then 

aquacel dressing.  The graft was mesh 1:1.5 and placed over the area with 

approximately 15 square cm of area.  It was secured in place using multiple 

interrupted 4 0 Monocryl.  A sterile dressing was placed.  Patient tolerated 

procedure well.  There were no immediate complications.  All counts were 

correct.











EPI FARFAN MD Sep 6, 2017 09:26

## 2017-10-02 ENCOUNTER — HOSPITAL ENCOUNTER (OUTPATIENT)
Dept: HOSPITAL 61 - PMGWOUND | Age: 22
Discharge: HOME | End: 2017-10-02
Attending: EMERGENCY MEDICINE
Payer: COMMERCIAL

## 2017-10-02 DIAGNOSIS — Z79.82: ICD-10-CM
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
## 2017-10-02 DIAGNOSIS — S86.022D: Primary | ICD-10-CM

## 2017-10-02 DIAGNOSIS — F12.90: ICD-10-CM

## 2017-10-02 DIAGNOSIS — M86.68: ICD-10-CM

## 2017-10-02 PROCEDURE — 97597 DBRDMT OPN WND 1ST 20 CM/<: CPT

## 2017-10-04 ENCOUNTER — HOSPITAL ENCOUNTER (OUTPATIENT)
Dept: HOSPITAL 61 - PMGWOUND | Age: 22
Discharge: HOME | End: 2017-10-04
Attending: FAMILY MEDICINE
Payer: COMMERCIAL

## 2017-10-04 DIAGNOSIS — M86.68: ICD-10-CM

## 2017-10-04 DIAGNOSIS — F12.90: ICD-10-CM

## 2017-10-04 DIAGNOSIS — S86.022D: Primary | ICD-10-CM
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
## 2017-10-04 PROCEDURE — 99214 OFFICE O/P EST MOD 30 MIN: CPT

## 2017-10-06 ENCOUNTER — HOSPITAL ENCOUNTER (OUTPATIENT)
Dept: HOSPITAL 61 - PMGWOUND | Age: 22
Discharge: HOME | End: 2017-10-06
Attending: FAMILY MEDICINE
Payer: COMMERCIAL

## 2017-10-06 DIAGNOSIS — S86.022D: Primary | ICD-10-CM
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
## 2017-10-06 PROCEDURE — 99214 OFFICE O/P EST MOD 30 MIN: CPT

## 2017-10-09 ENCOUNTER — HOSPITAL ENCOUNTER (OUTPATIENT)
Dept: HOSPITAL 61 - PMGWOUND | Age: 22
Discharge: HOME | End: 2017-10-09
Attending: EMERGENCY MEDICINE
Payer: COMMERCIAL

## 2017-10-09 DIAGNOSIS — F12.90: ICD-10-CM

## 2017-10-09 DIAGNOSIS — M86.68: ICD-10-CM

## 2017-10-09 DIAGNOSIS — S86.002D: Primary | ICD-10-CM
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
## 2017-10-09 PROCEDURE — 99214 OFFICE O/P EST MOD 30 MIN: CPT

## 2017-10-12 ENCOUNTER — HOSPITAL ENCOUNTER (OUTPATIENT)
Dept: HOSPITAL 61 - PMGWOUND | Age: 22
Discharge: HOME | End: 2017-10-12
Attending: EMERGENCY MEDICINE
Payer: COMMERCIAL
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
## 2017-10-12 DIAGNOSIS — M86.68: ICD-10-CM

## 2017-10-12 DIAGNOSIS — S86.022D: Primary | ICD-10-CM

## 2017-10-12 LAB
ANION GAP SERPL CALC-SCNC: 8 MMOL/L (ref 6–14)
BUN SERPL-MCNC: 13 MG/DL (ref 8–26)
CALCIUM SERPL-MCNC: 9.4 MG/DL (ref 8.5–10.1)
CHLORIDE SERPL-SCNC: 103 MMOL/L (ref 98–107)
CO2 SERPL-SCNC: 32 MMOL/L (ref 21–32)
CREAT SERPL-MCNC: 1.2 MG/DL (ref 0.7–1.3)
CRP SERPL-MCNC: 0.6 MG/L (ref 0–3.3)
GFR SERPLBLD BASED ON 1.73 SQ M-ARVRAT: 90.8 ML/MIN
GLUCOSE SERPL-MCNC: 94 MG/DL (ref 70–99)
POTASSIUM SERPL-SCNC: 4 MMOL/L (ref 3.5–5.1)
SODIUM SERPL-SCNC: 143 MMOL/L (ref 136–145)

## 2017-10-12 PROCEDURE — 86140 C-REACTIVE PROTEIN: CPT

## 2017-10-12 PROCEDURE — 36415 COLL VENOUS BLD VENIPUNCTURE: CPT

## 2017-10-12 PROCEDURE — 80048 BASIC METABOLIC PNL TOTAL CA: CPT

## 2017-10-12 PROCEDURE — 85651 RBC SED RATE NONAUTOMATED: CPT

## 2017-10-12 PROCEDURE — 15275 SKIN SUB GRAFT FACE/NK/HF/G: CPT

## 2017-10-16 ENCOUNTER — HOSPITAL ENCOUNTER (OUTPATIENT)
Dept: HOSPITAL 61 - PMGWOUND | Age: 22
Discharge: HOME | End: 2017-10-16
Attending: EMERGENCY MEDICINE
Payer: COMMERCIAL

## 2017-10-16 DIAGNOSIS — M86.68: ICD-10-CM

## 2017-10-16 DIAGNOSIS — F12.90: ICD-10-CM

## 2017-10-16 DIAGNOSIS — S86.022D: Primary | ICD-10-CM
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
## 2017-10-16 PROCEDURE — 99214 OFFICE O/P EST MOD 30 MIN: CPT

## 2017-10-19 ENCOUNTER — HOSPITAL ENCOUNTER (OUTPATIENT)
Dept: HOSPITAL 61 - PMGWOUND | Age: 22
Discharge: HOME | End: 2017-10-19
Attending: EMERGENCY MEDICINE
Payer: COMMERCIAL

## 2017-10-19 DIAGNOSIS — S86.022D: Primary | ICD-10-CM

## 2017-10-19 DIAGNOSIS — F12.90: ICD-10-CM

## 2017-10-19 DIAGNOSIS — M86.68: ICD-10-CM
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
## 2017-10-19 PROCEDURE — 97597 DBRDMT OPN WND 1ST 20 CM/<: CPT

## 2017-10-23 ENCOUNTER — HOSPITAL ENCOUNTER (OUTPATIENT)
Dept: HOSPITAL 61 - PMGWOUND | Age: 22
Discharge: HOME | End: 2017-10-23
Attending: EMERGENCY MEDICINE
Payer: COMMERCIAL

## 2017-10-23 DIAGNOSIS — S86.002D: Primary | ICD-10-CM
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
## 2017-10-23 DIAGNOSIS — F12.90: ICD-10-CM

## 2017-10-23 DIAGNOSIS — M86.68: ICD-10-CM

## 2017-10-23 PROCEDURE — 99214 OFFICE O/P EST MOD 30 MIN: CPT

## 2017-11-27 ENCOUNTER — HOSPITAL ENCOUNTER (OUTPATIENT)
Dept: HOSPITAL 61 - PMGWOUND | Age: 22
Discharge: HOME | End: 2017-11-27
Attending: EMERGENCY MEDICINE
Payer: COMMERCIAL

## 2017-11-27 DIAGNOSIS — S86.022D: Primary | ICD-10-CM

## 2017-11-27 DIAGNOSIS — F12.90: ICD-10-CM
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
## 2017-11-27 DIAGNOSIS — M86.68: ICD-10-CM

## 2017-11-27 PROCEDURE — 99214 OFFICE O/P EST MOD 30 MIN: CPT

## 2017-12-11 ENCOUNTER — HOSPITAL ENCOUNTER (OUTPATIENT)
Dept: HOSPITAL 61 - PMGWOUND | Age: 22
Discharge: HOME | End: 2017-12-11
Attending: EMERGENCY MEDICINE
Payer: COMMERCIAL
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
## 2017-12-11 DIAGNOSIS — S86.022D: Primary | ICD-10-CM

## 2017-12-11 DIAGNOSIS — F12.90: ICD-10-CM

## 2017-12-11 DIAGNOSIS — M86.68: ICD-10-CM

## 2017-12-11 PROCEDURE — 99214 OFFICE O/P EST MOD 30 MIN: CPT

## 2017-12-18 ENCOUNTER — HOSPITAL ENCOUNTER (OUTPATIENT)
Dept: HOSPITAL 61 - PMGWOUND | Age: 22
Discharge: HOME | End: 2017-12-18
Attending: EMERGENCY MEDICINE
Payer: COMMERCIAL
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
## 2017-12-18 DIAGNOSIS — M86.68: ICD-10-CM

## 2017-12-18 DIAGNOSIS — F12.90: ICD-10-CM

## 2017-12-18 DIAGNOSIS — S86.022D: Primary | ICD-10-CM

## 2017-12-18 PROCEDURE — 99212 OFFICE O/P EST SF 10 MIN: CPT

## 2018-03-23 ENCOUNTER — HOSPITAL ENCOUNTER (EMERGENCY)
Dept: HOSPITAL 61 - ER | Age: 23
Discharge: HOME | End: 2018-03-23
Payer: COMMERCIAL

## 2018-03-23 VITALS
DIASTOLIC BLOOD PRESSURE: 75 MMHG | SYSTOLIC BLOOD PRESSURE: 135 MMHG | DIASTOLIC BLOOD PRESSURE: 75 MMHG | DIASTOLIC BLOOD PRESSURE: 75 MMHG | DIASTOLIC BLOOD PRESSURE: 75 MMHG | SYSTOLIC BLOOD PRESSURE: 135 MMHG | DIASTOLIC BLOOD PRESSURE: 75 MMHG | SYSTOLIC BLOOD PRESSURE: 135 MMHG | SYSTOLIC BLOOD PRESSURE: 135 MMHG | DIASTOLIC BLOOD PRESSURE: 75 MMHG | SYSTOLIC BLOOD PRESSURE: 135 MMHG | DIASTOLIC BLOOD PRESSURE: 75 MMHG | SYSTOLIC BLOOD PRESSURE: 135 MMHG | SYSTOLIC BLOOD PRESSURE: 135 MMHG | DIASTOLIC BLOOD PRESSURE: 75 MMHG | SYSTOLIC BLOOD PRESSURE: 135 MMHG | DIASTOLIC BLOOD PRESSURE: 75 MMHG | SYSTOLIC BLOOD PRESSURE: 135 MMHG
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
## 2018-03-23 DIAGNOSIS — S00.83XA: ICD-10-CM

## 2018-03-23 DIAGNOSIS — M54.5: ICD-10-CM

## 2018-03-23 DIAGNOSIS — S16.1XXA: Primary | ICD-10-CM
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
## 2018-03-23 PROCEDURE — 72100 X-RAY EXAM L-S SPINE 2/3 VWS: CPT

## 2018-03-23 PROCEDURE — 99284 EMERGENCY DEPT VISIT MOD MDM: CPT

## 2018-03-23 PROCEDURE — 70450 CT HEAD/BRAIN W/O DYE: CPT

## 2018-03-23 PROCEDURE — 72125 CT NECK SPINE W/O DYE: CPT

## 2018-03-23 RX ADMIN — HYDROCODONE BITARTRATE AND ACETAMINOPHEN 1 TAB: 5; 325 TABLET ORAL at 16:31

## 2018-05-18 ENCOUNTER — HOSPITAL ENCOUNTER (OUTPATIENT)
Dept: HOSPITAL 61 - PMGWOUND | Age: 23
Discharge: HOME | End: 2018-05-18
Attending: EMERGENCY MEDICINE
Payer: COMMERCIAL
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
## 2018-05-18 DIAGNOSIS — T81.31XA: Primary | ICD-10-CM

## 2018-05-18 DIAGNOSIS — L84: ICD-10-CM

## 2018-05-18 PROCEDURE — 87075 CULTR BACTERIA EXCEPT BLOOD: CPT

## 2018-05-18 PROCEDURE — 87071 CULTURE AEROBIC QUANT OTHER: CPT

## 2018-05-18 PROCEDURE — 97597 DBRDMT OPN WND 1ST 20 CM/<: CPT

## 2018-05-18 PROCEDURE — 87205 SMEAR GRAM STAIN: CPT

## 2018-05-21 ENCOUNTER — HOSPITAL ENCOUNTER (OUTPATIENT)
Dept: HOSPITAL 61 - PMGWOUND | Age: 23
Discharge: HOME | End: 2018-05-21
Attending: EMERGENCY MEDICINE
Payer: COMMERCIAL

## 2018-05-21 DIAGNOSIS — T81.31XD: Primary | ICD-10-CM

## 2018-05-21 DIAGNOSIS — L84: ICD-10-CM
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
## 2018-05-21 PROCEDURE — 97597 DBRDMT OPN WND 1ST 20 CM/<: CPT

## 2018-05-24 ENCOUNTER — HOSPITAL ENCOUNTER (OUTPATIENT)
Dept: HOSPITAL 61 - PMGWOUND | Age: 23
Discharge: HOME | End: 2018-05-24
Attending: EMERGENCY MEDICINE
Payer: COMMERCIAL

## 2018-05-24 DIAGNOSIS — T81.31XS: Primary | ICD-10-CM
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
## 2018-05-24 PROCEDURE — 99214 OFFICE O/P EST MOD 30 MIN: CPT

## 2018-05-31 ENCOUNTER — HOSPITAL ENCOUNTER (OUTPATIENT)
Dept: HOSPITAL 61 - PMGWOUND | Age: 23
Discharge: HOME | End: 2018-05-31
Attending: EMERGENCY MEDICINE
Payer: COMMERCIAL

## 2018-05-31 DIAGNOSIS — L84: ICD-10-CM
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
## 2018-05-31 DIAGNOSIS — T81.31XA: Primary | ICD-10-CM
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
## 2018-05-31 PROCEDURE — 97597 DBRDMT OPN WND 1ST 20 CM/<: CPT

## 2018-05-31 PROCEDURE — 11042 DBRDMT SUBQ TIS 1ST 20SQCM/<: CPT

## 2018-06-07 ENCOUNTER — HOSPITAL ENCOUNTER (OUTPATIENT)
Dept: HOSPITAL 61 - PMGWOUND | Age: 23
Discharge: HOME | End: 2018-06-07
Attending: EMERGENCY MEDICINE
Payer: COMMERCIAL
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
## 2018-06-07 DIAGNOSIS — T81.31XS: Primary | ICD-10-CM

## 2018-06-07 PROCEDURE — 99214 OFFICE O/P EST MOD 30 MIN: CPT

## 2018-06-14 ENCOUNTER — HOSPITAL ENCOUNTER (OUTPATIENT)
Dept: HOSPITAL 61 - PMGWOUND | Age: 23
Discharge: HOME | End: 2018-06-14
Attending: EMERGENCY MEDICINE
Payer: COMMERCIAL
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
## 2018-06-14 DIAGNOSIS — L84: ICD-10-CM

## 2018-06-14 DIAGNOSIS — T81.31XD: Primary | ICD-10-CM

## 2018-06-14 PROCEDURE — 99214 OFFICE O/P EST MOD 30 MIN: CPT

## 2018-06-21 ENCOUNTER — HOSPITAL ENCOUNTER (OUTPATIENT)
Dept: HOSPITAL 61 - PMGWOUND | Age: 23
Discharge: HOME | End: 2018-06-21
Attending: EMERGENCY MEDICINE
Payer: COMMERCIAL

## 2018-06-21 DIAGNOSIS — T81.31XD: Primary | ICD-10-CM
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
## 2018-06-21 DIAGNOSIS — L84: ICD-10-CM

## 2018-06-21 PROCEDURE — 99213 OFFICE O/P EST LOW 20 MIN: CPT

## 2018-06-28 ENCOUNTER — HOSPITAL ENCOUNTER (OUTPATIENT)
Dept: HOSPITAL 61 - PMGWOUND | Age: 23
Discharge: HOME | End: 2018-06-28
Attending: EMERGENCY MEDICINE
Payer: COMMERCIAL
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
## 2018-06-28 DIAGNOSIS — T81.31XD: Primary | ICD-10-CM

## 2018-06-28 PROCEDURE — 97597 DBRDMT OPN WND 1ST 20 CM/<: CPT

## 2018-07-12 ENCOUNTER — HOSPITAL ENCOUNTER (OUTPATIENT)
Dept: HOSPITAL 61 - PMGWOUND | Age: 23
Discharge: HOME | End: 2018-07-12
Attending: EMERGENCY MEDICINE
Payer: COMMERCIAL

## 2018-07-12 DIAGNOSIS — T81.31XD: Primary | ICD-10-CM
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
## 2018-07-12 PROCEDURE — 97597 DBRDMT OPN WND 1ST 20 CM/<: CPT

## 2018-07-26 ENCOUNTER — HOSPITAL ENCOUNTER (OUTPATIENT)
Dept: HOSPITAL 61 - PMGWOUND | Age: 23
Discharge: HOME | End: 2018-07-26
Attending: EMERGENCY MEDICINE
Payer: COMMERCIAL

## 2018-07-26 DIAGNOSIS — T81.31XD: Primary | ICD-10-CM
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
## 2018-07-26 PROCEDURE — 97597 DBRDMT OPN WND 1ST 20 CM/<: CPT

## 2018-08-09 ENCOUNTER — HOSPITAL ENCOUNTER (OUTPATIENT)
Dept: HOSPITAL 61 - PMGWOUND | Age: 23
Discharge: HOME | End: 2018-08-09
Attending: EMERGENCY MEDICINE
Payer: COMMERCIAL
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
## 2018-08-09 DIAGNOSIS — T81.31XD: Primary | ICD-10-CM

## 2018-08-09 PROCEDURE — 97597 DBRDMT OPN WND 1ST 20 CM/<: CPT

## 2018-08-23 ENCOUNTER — HOSPITAL ENCOUNTER (OUTPATIENT)
Dept: HOSPITAL 61 - PMGWOUND | Age: 23
Discharge: HOME | End: 2018-08-23
Attending: EMERGENCY MEDICINE
Payer: COMMERCIAL
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
## 2018-08-23 DIAGNOSIS — T81.31XD: Primary | ICD-10-CM

## 2018-08-23 PROCEDURE — 15275 SKIN SUB GRAFT FACE/NK/HF/G: CPT

## 2018-08-30 ENCOUNTER — HOSPITAL ENCOUNTER (OUTPATIENT)
Dept: HOSPITAL 61 - PMGWOUND | Age: 23
Discharge: HOME | End: 2018-08-30
Attending: EMERGENCY MEDICINE
Payer: COMMERCIAL

## 2018-08-30 DIAGNOSIS — T81.31XS: Primary | ICD-10-CM
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
## 2018-08-30 PROCEDURE — 99213 OFFICE O/P EST LOW 20 MIN: CPT

## 2018-09-06 ENCOUNTER — HOSPITAL ENCOUNTER (OUTPATIENT)
Dept: HOSPITAL 61 - PMGWOUND | Age: 23
Discharge: HOME | End: 2018-09-06
Attending: EMERGENCY MEDICINE
Payer: COMMERCIAL
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
## 2018-09-06 DIAGNOSIS — T81.31XD: Primary | ICD-10-CM

## 2018-09-06 PROCEDURE — 99214 OFFICE O/P EST MOD 30 MIN: CPT

## 2018-09-06 PROCEDURE — G0463 HOSPITAL OUTPT CLINIC VISIT: HCPCS

## 2018-09-13 ENCOUNTER — HOSPITAL ENCOUNTER (OUTPATIENT)
Dept: HOSPITAL 61 - PMGWOUND | Age: 23
Discharge: HOME | End: 2018-09-13
Attending: EMERGENCY MEDICINE
Payer: COMMERCIAL

## 2018-09-13 DIAGNOSIS — L84: ICD-10-CM

## 2018-09-13 DIAGNOSIS — T81.31XD: Primary | ICD-10-CM
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
## 2018-09-13 PROCEDURE — G0463 HOSPITAL OUTPT CLINIC VISIT: HCPCS

## 2018-09-13 PROCEDURE — 99214 OFFICE O/P EST MOD 30 MIN: CPT

## 2018-09-20 ENCOUNTER — HOSPITAL ENCOUNTER (OUTPATIENT)
Dept: HOSPITAL 61 - PMGWOUND | Age: 23
Discharge: HOME | End: 2018-09-20
Attending: EMERGENCY MEDICINE
Payer: COMMERCIAL

## 2018-09-20 DIAGNOSIS — T81.31XD: Primary | ICD-10-CM

## 2018-09-20 DIAGNOSIS — L84: ICD-10-CM
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
## 2018-09-20 PROCEDURE — 99213 OFFICE O/P EST LOW 20 MIN: CPT

## 2018-10-04 ENCOUNTER — HOSPITAL ENCOUNTER (OUTPATIENT)
Dept: HOSPITAL 61 - PMGWOUND | Age: 23
Discharge: HOME | End: 2018-10-04
Attending: FAMILY MEDICINE
Payer: COMMERCIAL
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
## 2018-10-04 DIAGNOSIS — L84: ICD-10-CM

## 2018-10-04 DIAGNOSIS — T81.31XD: Primary | ICD-10-CM

## 2018-10-04 PROCEDURE — 99213 OFFICE O/P EST LOW 20 MIN: CPT

## 2018-10-11 ENCOUNTER — HOSPITAL ENCOUNTER (OUTPATIENT)
Dept: HOSPITAL 61 - PMGWOUND | Age: 23
Discharge: HOME | End: 2018-10-11
Attending: EMERGENCY MEDICINE
Payer: COMMERCIAL
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
## 2018-10-11 DIAGNOSIS — L84: ICD-10-CM

## 2018-10-11 DIAGNOSIS — T81.31XD: Primary | ICD-10-CM

## 2018-10-11 PROCEDURE — 99213 OFFICE O/P EST LOW 20 MIN: CPT

## 2018-10-25 ENCOUNTER — HOSPITAL ENCOUNTER (OUTPATIENT)
Dept: HOSPITAL 61 - PMGWOUND | Age: 23
Discharge: HOME | End: 2018-10-25
Attending: EMERGENCY MEDICINE
Payer: COMMERCIAL
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
## 2018-10-25 DIAGNOSIS — L84: ICD-10-CM

## 2018-10-25 DIAGNOSIS — T81.31XD: Primary | ICD-10-CM

## 2018-10-25 PROCEDURE — 99212 OFFICE O/P EST SF 10 MIN: CPT

## 2020-11-26 ENCOUNTER — HOSPITAL ENCOUNTER (EMERGENCY)
Dept: HOSPITAL 61 - ER | Age: 25
Discharge: HOME | End: 2020-11-26
Payer: COMMERCIAL

## 2020-11-26 VITALS — HEIGHT: 77 IN | WEIGHT: 149.91 LBS | BODY MASS INDEX: 17.7 KG/M2

## 2020-11-26 VITALS — SYSTOLIC BLOOD PRESSURE: 137 MMHG | DIASTOLIC BLOOD PRESSURE: 79 MMHG
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
## 2020-11-26 DIAGNOSIS — S39.012A: ICD-10-CM
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
## 2020-11-26 DIAGNOSIS — S16.1XXA: Primary | ICD-10-CM
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
## 2020-11-26 PROCEDURE — 99283 EMERGENCY DEPT VISIT LOW MDM: CPT
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
## 2020-12-19 ENCOUNTER — HOSPITAL ENCOUNTER (EMERGENCY)
Dept: HOSPITAL 61 - ER | Age: 25
Discharge: HOME | End: 2020-12-19
Payer: COMMERCIAL

## 2020-12-19 VITALS — SYSTOLIC BLOOD PRESSURE: 135 MMHG | DIASTOLIC BLOOD PRESSURE: 62 MMHG

## 2020-12-19 VITALS — WEIGHT: 141.32 LBS | HEIGHT: 77 IN | BODY MASS INDEX: 16.69 KG/M2

## 2020-12-19 DIAGNOSIS — F17.200: ICD-10-CM

## 2020-12-19 DIAGNOSIS — M54.6: Primary | ICD-10-CM
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
## 2020-12-19 DIAGNOSIS — G89.11: ICD-10-CM
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
## 2020-12-19 DIAGNOSIS — M54.5: ICD-10-CM
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
## 2020-12-19 PROCEDURE — 99284 EMERGENCY DEPT VISIT MOD MDM: CPT

## 2020-12-19 PROCEDURE — 71046 X-RAY EXAM CHEST 2 VIEWS: CPT

## 2020-12-19 PROCEDURE — 72072 X-RAY EXAM THORAC SPINE 3VWS: CPT
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
